# Patient Record
Sex: FEMALE | Race: WHITE | Employment: OTHER | URBAN - METROPOLITAN AREA
[De-identification: names, ages, dates, MRNs, and addresses within clinical notes are randomized per-mention and may not be internally consistent; named-entity substitution may affect disease eponyms.]

---

## 2017-05-16 ENCOUNTER — TRANSFERRED RECORDS (OUTPATIENT)
Dept: MULTI SPECIALTY CLINIC | Facility: CLINIC | Age: 62
End: 2017-05-16

## 2017-05-25 ENCOUNTER — RADIANT APPOINTMENT (OUTPATIENT)
Dept: MAMMOGRAPHY | Facility: CLINIC | Age: 62
End: 2017-05-25
Payer: COMMERCIAL

## 2017-05-25 ENCOUNTER — RESULT FOLLOW UP (OUTPATIENT)
Dept: OBGYN | Facility: CLINIC | Age: 62
End: 2017-05-25

## 2017-05-25 ENCOUNTER — OFFICE VISIT (OUTPATIENT)
Dept: OBGYN | Facility: CLINIC | Age: 62
End: 2017-05-25
Payer: COMMERCIAL

## 2017-05-25 VITALS
HEART RATE: 56 BPM | WEIGHT: 125 LBS | DIASTOLIC BLOOD PRESSURE: 70 MMHG | BODY MASS INDEX: 21.34 KG/M2 | SYSTOLIC BLOOD PRESSURE: 106 MMHG | HEIGHT: 64 IN

## 2017-05-25 DIAGNOSIS — Z12.4 CERVICAL CANCER SCREENING: ICD-10-CM

## 2017-05-25 DIAGNOSIS — Z01.419 ENCOUNTER FOR GYNECOLOGICAL EXAMINATION WITHOUT ABNORMAL FINDING: Primary | ICD-10-CM

## 2017-05-25 DIAGNOSIS — Z12.31 VISIT FOR SCREENING MAMMOGRAM: ICD-10-CM

## 2017-05-25 PROCEDURE — G0202 SCR MAMMO BI INCL CAD: HCPCS | Mod: TC

## 2017-05-25 PROCEDURE — 87624 HPV HI-RISK TYP POOLED RSLT: CPT | Performed by: OBSTETRICS & GYNECOLOGY

## 2017-05-25 PROCEDURE — 99396 PREV VISIT EST AGE 40-64: CPT | Performed by: OBSTETRICS & GYNECOLOGY

## 2017-05-25 PROCEDURE — 77063 BREAST TOMOSYNTHESIS BI: CPT | Mod: TC

## 2017-05-25 PROCEDURE — G0145 SCR C/V CYTO,THINLAYER,RESCR: HCPCS | Performed by: OBSTETRICS & GYNECOLOGY

## 2017-05-25 ASSESSMENT — ANXIETY QUESTIONNAIRES
1. FEELING NERVOUS, ANXIOUS, OR ON EDGE: NOT AT ALL
2. NOT BEING ABLE TO STOP OR CONTROL WORRYING: NOT AT ALL
IF YOU CHECKED OFF ANY PROBLEMS ON THIS QUESTIONNAIRE, HOW DIFFICULT HAVE THESE PROBLEMS MADE IT FOR YOU TO DO YOUR WORK, TAKE CARE OF THINGS AT HOME, OR GET ALONG WITH OTHER PEOPLE: NOT DIFFICULT AT ALL
GAD7 TOTAL SCORE: 0
6. BECOMING EASILY ANNOYED OR IRRITABLE: NOT AT ALL
5. BEING SO RESTLESS THAT IT IS HARD TO SIT STILL: NOT AT ALL
3. WORRYING TOO MUCH ABOUT DIFFERENT THINGS: NOT AT ALL
7. FEELING AFRAID AS IF SOMETHING AWFUL MIGHT HAPPEN: NOT AT ALL

## 2017-05-25 ASSESSMENT — PATIENT HEALTH QUESTIONNAIRE - PHQ9: 5. POOR APPETITE OR OVEREATING: NOT AT ALL

## 2017-05-25 NOTE — PROGRESS NOTES
Kristi is a 62 year old  female who presents for annual exam.     Besides routine health maintenance, she has no other health concerns today .    HPI:  The patient's PCP is Saji An MD.      No VB/spotting.     Exercising 5x/wk, varied.   Does not take calcium.     PCP manages labs meds.       GYNECOLOGIC HISTORY:    No LMP recorded. Patient is postmenopausal.  Her current contraception method is: menopause.  She  reports that she has quit smoking. She does not have any smokeless tobacco history on file.    Patient is not sexually active.  STD testing offered?  Declined  Last PHQ-9 score on record =   PHQ-9 SCORE 2017   Total Score 0     Last GAD7 score on record =   ALEC-7 SCORE 2017   Total Score 0     Alcohol Score = 3    HEALTH MAINTENANCE:  Cholesterol: 10/26/11   Total= 221, Triglycerides=79, HDL=86, NYF=152, FBS=92, Dr An checked it this last year   Cholesterol   Date Value Ref Range Status   10/26/2011 221 (A) 115 - 199 mg/dL Final    Last Mammo: 16, Result: normal, Next Mammo: today   Pap:   WNL,  wnl,  wnl, 4/3/12, Neg, Hpv Neg  Colonoscopy:  1 week  All normal, Result: normal, Next Colonoscopy:  years.  Dexa:  6/8/15 T-1.5, Osteopenia    Health maintenance updated:  yes    HISTORY:  Obstetric History       T2      L3     SAB0   TAB0   Ectopic0   Multiple0   Live Births0       # Outcome Date GA Lbr Alexx/2nd Weight Sex Delivery Anes PTL Lv   4   33w0d  4 lb 6 oz (1.984 kg) F    KAMILLE      Name: Verito   3 Term  39w0d  6 lb 3 oz (2.807 kg) M    KAMILLE      Name: Alden   2 Term  37w0d  5 lb 10 oz (2.551 kg) M    K[place]ILLE      Name: Surya   1 SAB                   Patient Active Problem List   Diagnosis     Essential hypertension     Cerebral infarction (H)     Hypotension, postural     Past Surgical History:   Procedure Laterality Date     NO HISTORY OF SURGERY        Social History   Substance Use Topics     Smoking status:  "Former Smoker     Smokeless tobacco: Not on file     Alcohol use 0.0 oz/week     0 Standard drinks or equivalent per week      Problem (# of Occurrences) Relation (Name,Age of Onset)    Anxiety Disorder (1) Daughter    Colon Cancer (1) Paternal Grandfather (60)    DIABETES (1) Mother    Hypertension (1) Mother    OSTEOPOROSIS (1) Father            Current Outpatient Prescriptions   Medication Sig     Cyanocobalamin (VITAMIN B-12 PO)      VITAMIN D, CHOLECALCIFEROL, PO Take by mouth daily     ASPIRIN PO Take  by mouth.       quinapril (ACCUPRIL) 40 MG tablet TAKE ONE TABLET BY MOUTH EVERY DAY     atenolol (TENORMIN) 50 MG tablet Take 0.5 tablets by mouth daily.     No current facility-administered medications for this visit.      No Known Allergies    Past medical, surgical, social and family histories were reviewed and updated in EPIC.    ROS:   12 point review of systems negative other than symptoms noted below.    EXAM:  /70  Pulse 56  Ht 5' 3.5\" (1.613 m)  Wt 125 lb (56.7 kg)  BMI 21.8 kg/m2   BMI: Body mass index is 21.8 kg/(m^2).    PHYSICAL EXAM:  Constitutional:  Appearance: Well nourished, well developed, alert, in no acute distress  Neck:  Lymph Nodes:  No lymphadenopathy present    Thyroid:  Gland size normal, nontender, no nodules or masses present  on palpation  Chest:  Respiratory Effort:  Breathing unlabored  Cardiovascular:    Heart: Auscultation:  Regular rate, normal rhythm, no murmurs present  Breasts: Inspection of Breasts:  No lymphadenopathy present    Palpation of Breasts and Axillae:  No masses present on palpation, no  breast tenderness    Axillary Lymph Nodes:  No lymphadenopathy present  Gastrointestinal:   Abdominal Examination:  Abdomen nontender to palpation, tone normal without rigidity or guarding, no masses present, umbilicus without lesions   Liver and Spleen:  No hepatomegaly present, liver nontender to palpation    Hernias:  No hernias present  Lymphatic: Lymph Nodes:  No " other lymphadenopathy present  Skin:  General Inspection:  No rashes present, no lesions present, no areas of  discoloration    Genitalia and Groin:  No rashes present, no lesions present, no areas of  discoloration, no masses present  Neurologic/Psychiatric:    Mental Status:  Oriented X3     Pelvic Exam:  External Genitalia:     Normal appearance for age, no discharge present, no tenderness present, no inflammatory lesions present, color normal  Vagina:     Normal vaginal vault without central or paravaginal defects, no discharge present, no inflammatory lesions present, no masses present  Bladder:     Nontender to palpation  Urethra:   Urethral Body:  Urethra palpation normal, urethra structural support normal   Urethral Meatus:  No erythema or lesions present  Cervix:     Appearance healthy, no lesions present, nontender to palpation, no bleeding present  Uterus:     Nontender to palpation, no masses present, position anteflexed, mobility: normal  Adnexa:     No adnexal tenderness present, no adnexal masses present  Perineum:     Perineum within normal limits, no evidence of trauma, no rashes or skin lesions present  Anus:     Anus within normal limits, no hemorrhoids present  Inguinal Lymph Nodes:     No lymphadenopathy present  Pubic Hair:     Normal pubic hair distribution for age  Genitalia and Groin:     No rashes present, no lesions present, no areas of discoloration, no masses present    COUNSELING:   Reviewed preventive health counseling, as reflected in patient instructions       Osteoporosis Prevention/Bone Health    BMI: Body mass index is 21.8 kg/(m^2).      ASSESSMENT:  62 year old female with satisfactory annual exam.    ICD-10-CM    1. Encounter for gynecological examination without abnormal finding Z01.419 Pap imaged thin layer screen with HPV - recommended age 30 - 65     HPV High Risk Types DNA Cervical       PLAN:  -Pap/hpv obtained for cervical cancer screening. Reviewed guidelines.  -Breast  self awareness discussed. Due for mammogram.  -Discussed exercise-making plan, strength training. Nutrition encouraged.  -Colonoscopy UTD  -Osteoporosis prevention discussed.  -PCP manages labs  -PMB precautions  -Return one year for next annual exam      Morelia Teran Masters, DO

## 2017-05-25 NOTE — LETTER
May 11, 2018      Kristi ENGL[place] More  [address] [facility] [address]    Dear MsUli,      At [facility], your health and wellness is our primary concern. That is why we are following up on a positive high risk HPV test from 05/25/17. Your provider had recommended that you have a Pap smear and HPV test completed by 05/25/18. Our records do not show that this has been scheduled.    It is important to complete the follow up that your provider has suggested for you to ensure that there are no worsening changes which may, over time, develop into cancer.      Please contact our office at  [phone] to schedule an appointment for a Pap smear and HPV test at your earliest convenience. If you have questions or concerns, please call the clinic and we will be happy to assist you.    If you have completed the tests outside of [facility], please have the results forwarded to our office. We will update the chart for your primary Physician to review before your next annual physical.     Thank you for choosing [facility]!    Sincerely,      Morelia Teran Masters, DO/esh

## 2017-05-25 NOTE — MR AVS SNAPSHOT
After Visit Summary   5/25/2017    Kristi Aguero    MRN: [id number]           Patient Information     Date Of Birth          1955        Visit Information        Provider Department      5/25/2017 10:30 AM Morelia Crabtree,  [facility]        Today's Diagnoses     Encounter for gynecological examination without abnormal finding    -  1    Cervical cancer screening           Follow-ups after your visit        Follow-up notes from your care team     Return in about 1 year (around 5/25/2018).      Your next 10 appointments already scheduled     May 25, 2017 11:00 AM CDT   MA SCREENING DIGITAL BILATERAL with WEMA1   [facility] ([facility])    [address] [place] [address], [address]  [facility] [address] [phone]           Do not use any powder, lotion or deodorant under your arms or on your breast. If you do, we will ask you to remove it before your exam.  Wear comfortable, two-piece clothing.  If you have any allergies, tell your care team.  Bring any previous mammograms from other facilities or have them mailed to the breast center.              Who to contact     If you have questions or need follow up information about today's clinic visit or your schedule please contact [facility] directly at [phone].  Normal or non-critical lab and imaging results will be communicated to you by MyChart, letter or phone within 4 business days after the clinic has received the results. If you do not hear from us within 7 days, please contact the clinic through MyChart or phone. If you have a critical or abnormal lab result, we will notify you by phone as soon as possible.  Submit refill requests through test company or call your pharmacy and they will forward the refill request to us. Please allow 3 business days for your refill to be completed.          Additional Information About Your Visit        MyChart Information      "[employer] lets you send messages to your doctor, view your test results, renew your prescriptions, schedule appointments and more. To sign up, go to [email or web address] . Click on \"Log in\" on the left side of the screen, which will take you to the Welcome page. Then click on \"Sign up Now\" on the right side of the page.     You will be asked to enter the access code listed below, as well as some personal information. Please follow the directions to create your username and password.     Your access code is: [email or web address]  Expires: 2017 10:57 AM     Your access code will  in 90 days. If you need help or a new code, please call your [facility] clinic or [phone].        Care EveryWhere ID     This is your [facility] EveryWhere ID. This could be used by other organizations to access your [facility] medical records  [id number]        Your Vitals Were     Pulse Height BMI (Body Mass Index)             56 5' 3.5\" (1.613 m) 21.8 kg/m2          Blood Pressure from Last 3 Encounters:   17 106/70   16 102/68   05/19/15 126/78    Weight from Last 3 Encounters:   17 125 lb (56.7 kg)   16 130 lb (59 kg)   05/19/15 128 lb (58.1 kg)              We Performed the Following     HPV High Risk Types DNA Cervical     Pap imaged thin layer screen with HPV - recommended age 30 - 65        Primary Care Provider Office Phone # Fax #    Saji An [phone]       [address] [address]        Thank you!     Thank you for choosing [facility]  for your care. Our goal is always to provide you with excellent care. Hearing back from our patients is one way we can continue to improve our services. Please take a few minutes to complete the written survey that you may receive in the mail after your visit with us. Thank you!             Your Updated Medication List - Protect others around you: Learn how to safely use, store and " throw away your medicines at www.disposemymeds.org.          This list is accurate as of: 5/25/17 10:57 AM.  Always use your most recent med list.                   Brand Name Dispense Instructions for use    ASPIRIN PO      Take  by mouth.       atenolol 50 MG tablet    TENORMIN    90 tablet    Take 0.5 tablets by mouth daily.       quinapril 40 MG Tab    ACCUPRIL    90 tablet    TAKE ONE TABLET BY MOUTH EVERY DAY       VITAMIN B-12 PO          VITAMIN D (CHOLECALCIFEROL) PO      Take by mouth daily

## 2017-05-25 NOTE — LETTER
June 1, 2017      Kristi Aguero  [address] [address] [facility] [address]    Dear ,      This letter is in regards to the PAP smear and HPV (Human Papillomavirus) test you had done recently. Your PAP test result is normal, but your HPV (Human Papillomavirus) test was positive.     About 80 percent of women have been exposed to HPV virus throughout their lifetime. There is no medication for the treatment of HPV. Typically your own immune system gets rid of the virus before it does harm. HPV is spread by direct skin-to-skin contact, including sexual intercourse, oral sex, anal sex, or any other contact involving the genital area (example: hand to genital contact). It is not possible to become infected with HPV by touching an object, such as a toilet seat. Most people who are infected with HPV have no signs or symptoms.    Things that you can do to boost your immune system and help your body get rid of HPV: get plenty of rest, eat a well-balanced diet of healthy foods, and stop smoking.     Please return in 1 year to repeat your pap smear and HPV test.     If you have additional questions regarding this result, please call [phone].    Sincerely,      Morelia Currys, DO/esh

## 2017-05-26 ASSESSMENT — ANXIETY QUESTIONNAIRES: GAD7 TOTAL SCORE: 0

## 2017-05-26 ASSESSMENT — PATIENT HEALTH QUESTIONNAIRE - PHQ9: SUM OF ALL RESPONSES TO PHQ QUESTIONS 1-9: 0

## 2017-05-30 LAB
COPATH REPORT: NORMAL
PAP: NORMAL

## 2017-06-01 PROBLEM — R87.810 CERVICAL HIGH RISK HPV (HUMAN PAPILLOMAVIRUS) TEST POSITIVE: Status: ACTIVE | Noted: 2017-05-25

## 2017-06-01 LAB
FINAL DIAGNOSIS: ABNORMAL
HPV HR 12 DNA CVX QL NAA+PROBE: POSITIVE
HPV16 DNA SPEC QL NAA+PROBE: NEGATIVE
HPV18 DNA SPEC QL NAA+PROBE: NEGATIVE
SPECIMEN DESCRIPTION: ABNORMAL

## 2017-06-01 NOTE — PROGRESS NOTES
5/25/17 NIL/+ HR HPV (not 16 or 18). Plan: cotest in 1 year  06/01/17 Result letter printed and sent at request of RN. ([facility])   05/11/18 Pap reminder letter sent. ([facility])  6/1/18 NIL pap/+ HR HPV (not 16 or 18). Plan: colposcopy by 9/1/18 6/11/18 left msg/advised of result and follow up  6/15/18 Colpo: ECC-benign. Plan: Cotest in 1 year ([facility])  6/4/19 NIL pap, Neg HPV. Plan: cotest in 3 years ([facility])

## 2017-08-17 ENCOUNTER — TELEPHONE (OUTPATIENT)
Dept: NURSING | Facility: CLINIC | Age: 62
End: 2017-08-17

## 2017-08-17 NOTE — TELEPHONE ENCOUNTER
Annual 5/25/17. Pt received letter in the mail from Dr. Crabtree and can not find of the letter. Reviewed letter sent 6/1/17 regarding pap and HPV testing. Offered to re-mail letter, pt decline. Will make appointment to be seen in one year for repeat pap and HPV.

## 2018-02-15 ENCOUNTER — OFFICE VISIT (OUTPATIENT)
Dept: OBGYN | Facility: CLINIC | Age: 63
End: 2018-02-15
Payer: COMMERCIAL

## 2018-02-15 VITALS
BODY MASS INDEX: 22.36 KG/M2 | HEART RATE: 76 BPM | DIASTOLIC BLOOD PRESSURE: 84 MMHG | WEIGHT: 131 LBS | SYSTOLIC BLOOD PRESSURE: 132 MMHG | HEIGHT: 64 IN

## 2018-02-15 DIAGNOSIS — M25.559 PAIN IN JOINT INVOLVING PELVIC REGION AND THIGH, UNSPECIFIED LATERALITY: Primary | ICD-10-CM

## 2018-02-15 PROCEDURE — 99214 OFFICE O/P EST MOD 30 MIN: CPT | Performed by: OBSTETRICS & GYNECOLOGY

## 2018-02-15 NOTE — PROGRESS NOTES
SUBJECTIVE:                                                   Kristi Aguero is a 63 year old female who presents to clinic today for the following health issue(s):  Patient presents with:  Pelvic Pain: has been having ongoing pelvic aches. Comes and goes, but is there most days.         HPI:  Reports low pelvic aching for 2-3 weeks. Happens more days than not. Will go to the inner thigh area as well. Nothing makes it better. Has not tried tylenol/ibuprofen. Made worse with sprinting.   No spotting/bleeding. No bowel or bladder changes. Has daily BM. No change in weight or appetite.  Has done some more intense workouts but not sure this would be reason. Has done just as much in past without this affect.  Never had any thing like this before.  Not SA, no partner.    Last colonoscopy 1yr ago.    Review of PMH, SocHx, SurHx, FHx, medications completed. Epic updated.      No LMP recorded. Patient is postmenopausal..   Patient is not sexually active, .  Using menopause and not sexually active for contraception.    reports that she has quit smoking. She has never used smokeless tobacco.    STD testing offered?  Declined    Health maintenance updated:  yes    Today's PHQ-2 Score:   PHQ-2 (  [employer]) 2/15/2018   Q1: Little interest or pleasure in doing things 0   Q2: Feeling down, depressed or hopeless 0   PHQ-2 Score 0     Today's PHQ-9 Score:   PHQ-9 SCORE 2017   Total Score 0     Today's ALEC-7 Score:   ALEC-7 SCORE 2017   Total Score 0       Problem list and histories reviewed & adjusted, as indicated.  Additional history: as documented.    Patient Active Problem List   Diagnosis     Essential hypertension     Cerebral infarction (H)     Hypotension, postural     Cervical high risk HPV (human papillomavirus) test positive     Past Surgical History:   Procedure Laterality Date     NO HISTORY OF SURGERY        Social History   Substance Use Topics     Smoking status: Former Smoker     Smokeless tobacco:  "Never Used     Alcohol use 0.0 oz/week     0 Standard drinks or equivalent per week      Problem (# of Occurrences) Relation (Name,Age of Onset)    Anxiety Disorder (1) Daughter    Colon Cancer (1) Paternal Grandfather (60)    DIABETES (1) Mother    Hypertension (1) Mother    OSTEOPOROSIS (1) Father            Current Outpatient Prescriptions   Medication Sig     omeprazole (PRILOSEC) 20 MG CR capsule Take 20 mg by mouth     Cyanocobalamin (VITAMIN B-12 PO)      VITAMIN D, CHOLECALCIFEROL, PO Take by mouth daily     ASPIRIN PO Take  by mouth.       quinapril (ACCUPRIL) 40 MG tablet TAKE ONE TABLET BY MOUTH EVERY DAY     atenolol (TENORMIN) 50 MG tablet Take 0.5 tablets by mouth daily.     No current facility-administered medications for this visit.      No Known Allergies    ROS:  12 point review of systems negative other than symptoms noted below.  Genitourinary: Pelvic Pain    OBJECTIVE:     /84  Pulse 76  Ht 5' 3.5\" (1.613 m)  Wt 131 lb (59.4 kg)  BMI 22.84 kg/m2  Body mass index is 22.84 kg/(m^2).    Exam:  Constitutional:  Appearance: Well nourished, well developed alert, in no acute distress  Chest:  Respiratory Effort:  Breathing unlabored  Gastrointestinal:  Abdominal Examination:  Abdomen nontender to palpation, tone normal without rigidity or guarding, no masses present, umbilicus without lesions; Liver/Spleen:  No hepatomegaly present, liver nontender to palpation; Hernias:  No hernias present  Lymphatic: Lymph Nodes:  No other lymphadenopathy present  Skin:General Inspection:  No rashes present, no lesions present, no areas of discoloration; Genitalia and Groin:  No rashes present, no lesions present, no areas of discoloration, no masses present.  Neurologic/Psychiatric:  Mental Status:  Oriented X3   Pelvic Exam:  External Genitalia:     Normal appearance for age, no discharge present, no tenderness present, no inflammatory lesions present, color normal  Vagina:     Normal vaginal vault " without central or paravaginal defects, no discharge present, no inflammatory lesions present, no masses present, inner pelvic sidewalls tender bilaterally, hypertonic.  Bladder:     Nontender to palpation  Urethra:   Urethral Body:  Urethra palpation normal, urethra structural support normal   Urethral Meatus:  No erythema or lesions present  Cervix:     Appearance healthy, no lesions present, nontender to palpation, no bleeding present  Uterus:     Uterus: firm, normal sized and nontender, midplane in position.   Adnexa:     No adnexal tenderness present, no adnexal masses present  Perineum:     Perineum within normal limits, no evidence of trauma, no rashes or skin lesions present  Anus:     Anus within normal limits, no hemorrhoids present  Inguinal Lymph Nodes:     No lymphadenopathy present  Pubic Hair:     Normal pubic hair distribution for age  Genitalia and Groin:     No rashes present, no lesions present, no areas of discoloration, no masses present       ASSESSMENT/PLAN:                                                        ICD-10-CM    1. Pain in joint involving pelvic region and thigh, unspecified laterality M25.559 US Transvaginal Non OB       -Diffuse pelvic pain. On exam, inner pelvic sidewalls tender and hypertonic bilaterally. Discussed possible etiologies. May be musculoskeletal based on exam and activity level. Will check pelvic US, if neg, will send for pelvic PT. Discussed how PT can help. Rec NSAIDs.    Morelia Teran Masters, DO  [facility] [place]

## 2018-02-15 NOTE — MR AVS SNAPSHOT
After Visit Summary   2/15/2018    Kristi Aguero    MRN: [id number]           Patient Information     Date Of Birth          1955        Visit Information        Provider Department      2/15/2018 10:50 AM Morelia Crabtree,  [facility]        Today's Diagnoses     Pain in joint involving pelvic region and thigh, unspecified laterality    -  1       Follow-ups after your visit        Your next 10 appointments already scheduled     Mar 05, 2018  2:30 PM CST   US PELVIC COMPLETE W TRANSVAGINAL with WEUS2   [facility] ([facility])    [address] [address] [phone]           Please bring a list of your medicines (including vitamins, minerals and over-the-counter drugs). Also, tell your doctor about any allergies you may have. Wear comfortable clothes and leave your valuables at home.  Adults: Drink six 8-ounce glasses of fluid one hour before your exam. Do NOT empty your bladder.  If you need to empty your bladder before your exam, try to release only a little bit of urine. Then, drink another 8oz glass of fluid.  Children: Children who are potty trained should drink at least 4 cups (32 oz) of liquid 45 minutes to one hour prior to the exam. The child s bladder must be full in order to achieve a diagnostic exam. If your child is very uncomfortable or has an urgent need to pee, please notify a technologist; they will try to find out how much longer the wait may be and provide instructions to help relieve the pressure. Occasionally it is medically necessary to insert a urinary catheter to fill the bladder.  Please call the Imaging Department at your exam site with any questions.            Mar 05, 2018  3:10 PM CST   Office Visit with Morelia Crabtree,    [facility] ([facility])    [address]  [place] [address]  "  [phone]           Bring a current list of meds and any records pertaining to this visit. For Physicals, please bring immunization records and any forms needing to be filled out. Please arrive 10 minutes early to complete paperwork.              Future tests that were ordered for you today     Open Future Orders        Priority Expected Expires Ordered    US Transvaginal Non OB Routine  2019 2/15/2018            Who to contact     If you have questions or need follow up information about today's clinic visit or your schedule please contact [facility] [place] directly at [phone].  Normal or non-critical lab and imaging results will be communicated to you by [employer], letter or phone within 4 business days after the clinic has received the results. If you do not hear from us within 7 days, please contact the clinic through [employer] or phone. If you have a critical or abnormal lab result, we will notify you by phone as soon as possible.  Submit refill requests through [employer] or call your pharmacy and they will forward the refill request to us. Please allow 3 business days for your refill to be completed.          Additional Information About Your Visit        MyChart Information     [employer] lets you send messages to your doctor, view your test results, renew your prescriptions, schedule appointments and more. To sign up, go to [email or web address] . Click on \"Log in\" on the left side of the screen, which will take you to the Welcome page. Then click on \"Sign up Now\" on the right side of the page.     You will be asked to enter the access code listed below, as well as some personal information. Please follow the directions to create your username and password.     Your access code is: [email or web address]  Expires: 2018 11:12 AM     Your access code will  in 90 days. If you need help or a new code, please call your [place] clinic or [phone].        Care EveryWhere ID     This is " "your Care EveryWhere ID. This could be used by other organizations to access your [place] medical records  [id number]        Your Vitals Were     Pulse Height BMI (Body Mass Index)             76 5' 3.5\" (1.613 m) 22.84 kg/m2          Blood Pressure from Last 3 Encounters:   02/15/18 132/84   05/25/17 106/70   05/23/16 102/68    Weight from Last 3 Encounters:   02/15/18 131 lb (59.4 kg)   05/25/17 125 lb (56.7 kg)   05/23/16 130 lb (59 kg)               Primary Care Provider Office Phone # Fax #    Saji An [phone]       [place] [address]        Equal Access to Services     NICHOLAS PANDEY : Hadii junior edwards hadasho Soomaali, waaxda luqadaha, qaybta kaalmada adeegyada, sanket rosa . So [facility] [phone].    ATENCIÓN: Si habla español, tiene a gaspar disposición servicios gratuitos de asistencia lingüística. Llame al [phone].    We comply with applicable federal civil rights laws and [place] laws. We do not discriminate on the basis of race, color, national origin, age, disability, sex, sexual orientation, or gender identity.            Thank you!     Thank you for choosing [facility]  for your care. Our goal is always to provide you with excellent care. Hearing back from our patients is one way we can continue to improve our services. Please take a few minutes to complete the written survey that you may receive in the mail after your visit with us. Thank you!             Your Updated Medication List - Protect others around you: Learn how to safely use, store and throw away your medicines at www.disposemymeds.org.          This list is accurate as of 2/15/18 11:12 AM.  Always use your most recent med list.                   Brand Name Dispense Instructions for use Diagnosis    ASPIRIN PO      Take  by mouth.        atenolol 50 MG tablet    TENORMIN    90 tablet    Take 0.5 tablets by mouth " daily.    Unspecified essential hypertension       omeprazole 20 MG CR capsule    priLOSEC     Take 20 mg by mouth        quinapril 40 MG Tab    ACCUPRIL    90 tablet    TAKE ONE TABLET BY MOUTH EVERY DAY    Muscle cramps       VITAMIN B-12 PO           VITAMIN D (CHOLECALCIFEROL) PO      Take by mouth daily

## 2018-02-19 ENCOUNTER — OFFICE VISIT (OUTPATIENT)
Dept: OBGYN | Facility: CLINIC | Age: 63
End: 2018-02-19
Payer: COMMERCIAL

## 2018-02-19 ENCOUNTER — RADIANT APPOINTMENT (OUTPATIENT)
Dept: ULTRASOUND IMAGING | Facility: CLINIC | Age: 63
End: 2018-02-19
Payer: COMMERCIAL

## 2018-02-19 VITALS
HEIGHT: 64 IN | BODY MASS INDEX: 22.3 KG/M2 | WEIGHT: 130.6 LBS | DIASTOLIC BLOOD PRESSURE: 80 MMHG | SYSTOLIC BLOOD PRESSURE: 138 MMHG

## 2018-02-19 DIAGNOSIS — M25.559 PAIN IN JOINT INVOLVING PELVIC REGION AND THIGH, UNSPECIFIED LATERALITY: ICD-10-CM

## 2018-02-19 DIAGNOSIS — Z78.0 POSTMENOPAUSE: ICD-10-CM

## 2018-02-19 DIAGNOSIS — R93.89 THICKENED ENDOMETRIUM: Primary | ICD-10-CM

## 2018-02-19 DIAGNOSIS — Z80.49 FAMILY HISTORY OF MALIGNANT NEOPLASM OF ENDOMETRIUM: ICD-10-CM

## 2018-02-19 PROCEDURE — 99213 OFFICE O/P EST LOW 20 MIN: CPT | Mod: 25 | Performed by: OBSTETRICS & GYNECOLOGY

## 2018-02-19 PROCEDURE — 58100 BIOPSY OF UTERUS LINING: CPT | Performed by: OBSTETRICS & GYNECOLOGY

## 2018-02-19 PROCEDURE — 76830 TRANSVAGINAL US NON-OB: CPT | Performed by: OBSTETRICS & GYNECOLOGY

## 2018-02-19 PROCEDURE — 88305 TISSUE EXAM BY PATHOLOGIST: CPT | Performed by: OBSTETRICS & GYNECOLOGY

## 2018-02-19 NOTE — PROGRESS NOTES
SUBJECTIVE:                                                   Kristi Aguero is a 63 year old female who presents to clinic today for the following health issue(s):  Patient presents with:  Ultrasound: here for follow up for pelvic pain. patient states still having pelvic pain      HPI:  No new changes since last visit.   No vaginal discharge.     Sister had endometrial cancer and needed hyst.     No LMP recorded. Patient is postmenopausal..   Patient is not sexually active, .  Using menopause for contraception.    reports that she has quit smoking. She has never used smokeless tobacco.    STD testing offered?  Declined    Health maintenance updated:  yes    Today's PHQ-2 Score:   PHQ-2 (  [employer]) 2/15/2018   Q1: Little interest or pleasure in doing things 0   Q2: Feeling down, depressed or hopeless 0   PHQ-2 Score 0     Today's PHQ-9 Score:   PHQ-9 SCORE 2017   Total Score 0     Today's ALEC-7 Score:   ALEC-7 SCORE 2017   Total Score 0       Problem list and histories reviewed & adjusted, as indicated.  Additional history: as documented.    Patient Active Problem List   Diagnosis     Essential hypertension     Cerebral infarction (H)     Hypotension, postural     Cervical high risk HPV (human papillomavirus) test positive     Past Surgical History:   Procedure Laterality Date     NO HISTORY OF SURGERY        Social History   Substance Use Topics     Smoking status: Former Smoker     Smokeless tobacco: Never Used     Alcohol use 0.0 oz/week     0 Standard drinks or equivalent per week      Problem (# of Occurrences) Relation (Name,Age of Onset)    Anxiety Disorder (1) Daughter    Colon Cancer (1) Paternal Grandfather (60)    DIABETES (1) Mother    Endometrial Cancer (1) Sister (50)    Hypertension (1) Mother    OSTEOPOROSIS (1) Father            Current Outpatient Prescriptions   Medication Sig     omeprazole (PRILOSEC) 20 MG CR capsule Take 20 mg by mouth     Cyanocobalamin (VITAMIN B-12 PO)       "VITAMIN D, CHOLECALCIFEROL, PO Take by mouth daily     ASPIRIN PO Take  by mouth.       quinapril (ACCUPRIL) 40 MG tablet TAKE ONE TABLET BY MOUTH EVERY DAY     atenolol (TENORMIN) 50 MG tablet Take 0.5 tablets by mouth daily.     No current facility-administered medications for this visit.      No Known Allergies    ROS:  12 point review of systems negative other than symptoms noted below.    OBJECTIVE:     /80  Ht 5' 3.5\" (1.613 m)  Wt 130 lb 9.6 oz (59.2 kg)  BMI 22.77 kg/m2  Body mass index is 22.77 kg/(m^2).    Exam:  Constitutional:  Appearance: Well nourished, well developed alert, in no acute distress  Chest:  Respiratory Effort:  Breathing unlabored  Lymphatic: Lymph Nodes:  No other lymphadenopathy present  Skin:General Inspection:  No rashes present, no lesions present, no areas of discoloration; Genitalia and Groin:  No rashes present, no lesions present, no areas of discoloration, no masses present.  Neurologic/Psychiatric:  Mental Status:  Oriented X3   Pelvic Exam:  External Genitalia:     Normal appearance for age, no discharge present, no tenderness present, no inflammatory lesions present, color normal  Vagina:     Normal vaginal vault without central or paravaginal defects, no discharge present, no inflammatory lesions present, no masses present  Bladder:     Nontender to palpation  Urethra:   Urethral Body:  Urethra palpation normal, urethra structural support normal   Urethral Meatus:  No erythema or lesions present  Cervix:     Appearance healthy, no lesions present, nontender to palpation, no bleeding present  Perineum:     Perineum within normal limits, no evidence of trauma, no rashes or skin lesions present  Anus:     Anus within normal limits, no hemorrhoids present  Inguinal Lymph Nodes:     No lymphadenopathy present  Pubic Hair:     Normal pubic hair distribution for age  Genitalia and Groin:     No rashes present, no lesions present, no areas of discoloration, no masses " present       In-Clinic Test Results:  Results for orders placed or performed in visit on 02/19/18 (from the past 24 hour(s))   US Transvaginal Non OB    Narrative                    Gynecological Ultrasound Report  Pelvic U/S - Transvaginal    [facility]     Referring Provider: Dr. Morelia Crabtree  Sonographer: Nataliya Jones MS     Indication: Pelvic pain  LMP (mm/dd/yyyy): Postmenopausa    Gynecological Ultrasonography:   Uterus: anteverted  Size: 5.13 x 3.90 x 3.18cm.    Findings: Normal   Endometrium: Thickness total 5.89mm  Findings: Thicker area of endometrium at fundus  Right Ovary: 1.66 x .91 x .78cm.    Left Ovary: 1.47 x 1.39 x 1.41cm.   Cul de Sac/Pouch of Derrick: No FF      Impression:  Endometrium thickened for postmenopausal female at 5.89mm. Normal adnexa.  Clinical correlation is recommended.    Morelia Crabtree, DO  ___________________________________________________________________________  _______                      ASSESSMENT/PLAN:                                                        ICD-10-CM    1. Thickened endometrium R93.8 ENDOMETRIAL BIOPSY W/O CERVICAL DILATION     Surgical pathology exam   2. Pain in joint involving pelvic region and thigh, unspecified laterality M25.559    3. Family history of malignant neoplasm of endometrium Z80.49 ENDOMETRIAL BIOPSY W/O CERVICAL DILATION     Surgical pathology exam   4. Postmenopause Z78.0 ENDOMETRIAL BIOPSY W/O CERVICAL DILATION     Surgical pathology exam       -Reviewed US findings with pt, thickened endometrium for PM state, is asymptomatic otherwise. Has nonspecific thickening in fundal region as well. Reviewed US from 2011, report without abnormalities (no growths), ES 1mm. Images could not be seen. Discussed potential significance, ie pre/malignant disease, and further eval available with EMB. Discussed is not likely source of her pelvic pain, would rec moving forward with Pelvic PT tx for MSK pain. Pt consented  for EMB. Will call with results and f/u plan.   Questions answered.     Morelia Crabtree DO  [facility] [place]          INDICATIONS:                                                    Is a pregnancy test required: No.  Was a consent obtained?  Yes    Having endometrial biopsy for Thickened endometrium postmenopause    Today's PHQ-2 Score:   PHQ-2 ( 1999 [employer]) 2/15/2018   Q1: Little interest or pleasure in doing things 0   Q2: Feeling down, depressed or hopeless 0   PHQ-2 Score 0       PROCEDURE;                                                      A speculum was placed in the vagina and cervix prepped with betadine. A tenaculum was attached to the cervix. A small plastic 5 mm Pipelle syringe curette was inserted into the cervical canal. The uterus was sounded to 6 cm's. A vigorous four quadrant biopsy was performed, removing amount moderate  of tissue. The speculum was removed. This tissue was placed in Formalin and sent to pathology.    The patient tolerated the procedure  well and she reported there was no cramping.      POST PROCEDURE;                                                      There  was no cramping at the time of discharge. She  tolerated the procedure well. There were no complications. Patient was discharged in stable condition.    Patient advised to call the clinic if severe pelvic pain, fever or heavy bleeding.    Morelia Crabtree DO

## 2018-02-19 NOTE — MR AVS SNAPSHOT
"              After Visit Summary   2018    Kristi Aguero    MRN: [id number]           Patient Information     Date Of Birth          1955        Visit Information        Provider Department      2018 1:40 PM Morelia Crabtree DO [facility]        Today's Diagnoses     Thickened endometrium    -  1    Pain in joint involving pelvic region and thigh, unspecified laterality        Family history of malignant neoplasm of endometrium        Postmenopause           Follow-ups after your visit        Who to contact     If you have questions or need follow up information about today's clinic visit or your schedule please contact [facility] directly at [phone].  Normal or non-critical lab and imaging results will be communicated to you by MyChart, letter or phone within 4 business days after the clinic has received the results. If you do not hear from us within 7 days, please contact the clinic through MyChart or phone. If you have a critical or abnormal lab result, we will notify you by phone as soon as possible.  Submit refill requests through [employer] or call your pharmacy and they will forward the refill request to us. Please allow 3 business days for your refill to be completed.          Additional Information About Your Visit        MyChart Information     [employer] lets you send messages to your doctor, view your test results, renew your prescriptions, schedule appointments and more. To sign up, go to [email or web address] . Click on \"Log in\" on the left side of the screen, which will take you to the Welcome page. Then click on \"Sign up Now\" on the right side of the page.     You will be asked to enter the access code listed below, as well as some personal information. Please follow the directions to create your username and password.     Your access code is: [email or web address]  Expires: 2018 11:12 AM     Your access code will  in 90 days. If you " "need help or a new code, please call your [place] clinic or [phone].        Care EveryWhere ID     This is your Care EveryWhere ID. This could be used by other organizations to access your [place] medical records  [id number]        Your Vitals Were     Height BMI (Body Mass Index)                5' 3.5\" (1.613 m) 22.77 kg/m2           Blood Pressure from Last 3 Encounters:   02/19/18 138/80   02/15/18 132/84   05/25/17 106/70    Weight from Last 3 Encounters:   02/19/18 130 lb 9.6 oz (59.2 kg)   02/15/18 131 lb (59.4 kg)   05/25/17 125 lb (56.7 kg)              We Performed the Following     ENDOMETRIAL BIOPSY W/O CERVICAL DILATION     Surgical pathology exam        Primary Care Provider Office Phone # Fax #    Saji An [phone]       [place] [address]        Equal Access to Services     NICHOLAS PANDEY : Hadii aad ku hadasho Soomaali, waaxda luqadaha, qaybta kaalmada adeegyada, waxay idiin haymaría elenan ernestine rosa . So [facility] [phone].    ATENCIÓN: Si habla español, tiene a gaspar disposición servicios gratuitos de asistencia lingüística. LlSt. Charles [facility] [phone].    We comply with applicable federal civil rights laws and [place] laws. We do not discriminate on the basis of race, color, national origin, age, disability, sex, sexual orientation, or gender identity.            Thank you!     Thank you for choosing [facility] [place]  for your care. Our goal is always to provide you with excellent care. Hearing back from our patients is one way we can continue to improve our services. Please take a few minutes to complete the written survey that you may receive in the mail after your visit with us. Thank you!             Your Updated Medication List - Protect others around you: Learn how to safely use, store and throw away your medicines at www.disposemymeds.org.          This list is accurate as of 2/19/18  2:01 PM.  Always " use your most recent med list.                   Brand Name Dispense Instructions for use Diagnosis    ASPIRIN PO      Take  by mouth.        atenolol 50 MG tablet    TENORMIN    90 tablet    Take 0.5 tablets by mouth daily.    Unspecified essential hypertension       omeprazole 20 MG CR capsule    priLOSEC     Take 20 mg by mouth        quinapril 40 MG Tab    ACCUPRIL    90 tablet    TAKE ONE TABLET BY MOUTH EVERY DAY    Muscle cramps       VITAMIN B-12 PO           VITAMIN D (CHOLECALCIFEROL) PO      Take by mouth daily

## 2018-02-21 LAB — COPATH REPORT: NORMAL

## 2018-02-23 NOTE — PROGRESS NOTES
EMB showing benign tissue, and endometrial polyp. The polyp is likely what is causing the thickening we saw. I would recommend she make an office visit to discuss how we can treat it.  Please call pt to notify.     Morelia Crabtree, DO

## 2018-03-05 ENCOUNTER — TELEPHONE (OUTPATIENT)
Dept: OBGYN | Facility: CLINIC | Age: 63
End: 2018-03-05

## 2018-03-05 ENCOUNTER — OFFICE VISIT (OUTPATIENT)
Dept: OBGYN | Facility: CLINIC | Age: 63
End: 2018-03-05
Payer: COMMERCIAL

## 2018-03-05 VITALS
SYSTOLIC BLOOD PRESSURE: 142 MMHG | BODY MASS INDEX: 22.23 KG/M2 | DIASTOLIC BLOOD PRESSURE: 86 MMHG | HEIGHT: 64 IN | WEIGHT: 130.2 LBS

## 2018-03-05 DIAGNOSIS — L98.9 CHEST SKIN LESION: ICD-10-CM

## 2018-03-05 DIAGNOSIS — M25.559 PAIN IN JOINT INVOLVING PELVIC REGION AND THIGH, UNSPECIFIED LATERALITY: Primary | ICD-10-CM

## 2018-03-05 DIAGNOSIS — R93.89 THICKENED ENDOMETRIUM: ICD-10-CM

## 2018-03-05 DIAGNOSIS — N95.0 POSTMENOPAUSAL BLEEDING: ICD-10-CM

## 2018-03-05 DIAGNOSIS — M25.559 PAIN IN JOINT INVOLVING PELVIC REGION AND THIGH, UNSPECIFIED LATERALITY: ICD-10-CM

## 2018-03-05 DIAGNOSIS — K21.9 GASTROESOPHAGEAL REFLUX DISEASE WITHOUT ESOPHAGITIS: ICD-10-CM

## 2018-03-05 DIAGNOSIS — N84.0 ENDOMETRIAL POLYP: Primary | ICD-10-CM

## 2018-03-05 PROCEDURE — 99214 OFFICE O/P EST MOD 30 MIN: CPT | Performed by: OBSTETRICS & GYNECOLOGY

## 2018-03-05 NOTE — TELEPHONE ENCOUNTER
Reason for Call:  Other call back    Detailed comments: Patient needs to ask you 1 more question after her visit with you this morning?    Phone Number Patient can be reached at: Cell number on file:    Telephone Information:   Mobile [phone]       Best Time: today    Can we leave a detailed message on this number? YES    Call taken on 3/5/2018 at 1:49 PM by Minal Hein

## 2018-03-05 NOTE — PROGRESS NOTES
SUBJECTIVE:                                                   Kristi Aguero is a 63 year old female who presents to clinic today for the following health issue(s):  Patient presents with:  Follow Up For: discuss biopsy results and treatment      HPI:  C/O a mole on her left breast, has been there for some time. Sees a dermatologist and gets mole checks. Due for yearly check.  Left breast was itchy about a month ago. Went away on it's own. No rash present. No nipple changes/discahrge.  Has also had heartburn one month ago, went to her internist. Given prilosec. Returned when stopped the prilosec. Drinks etoh in evening before bed, this is an irritant.   Due to all the symptoms above, is worried about metastatic melanoma. Was reading on the internet.     US  showed 5.89mm ES. EMB showed benign tissue with polyp      No LMP recorded. Patient is postmenopausal..   Patient is not sexually active, .  Using menopause for contraception.    reports that she has quit smoking. She has never used smokeless tobacco.    STD testing offered?  Declined    Health maintenance updated:  yes    Today's PHQ-2 Score:   PHQ-2 (  [employer]) 2/15/2018   Q1: Little interest or pleasure in doing things 0   Q2: Feeling down, depressed or hopeless 0   PHQ-2 Score 0     Today's PHQ-9 Score:   PHQ-9 SCORE 2017   Total Score 0     Today's ALEC-7 Score:   ALEC-7 SCORE 2017   Total Score 0       Problem list and histories reviewed & adjusted, as indicated.  Additional history: as documented.    Patient Active Problem List   Diagnosis     Essential hypertension     Cerebral infarction (H)     Hypotension, postural     Cervical high risk HPV (human papillomavirus) test positive     Past Surgical History:   Procedure Laterality Date     NO HISTORY OF SURGERY        Social History   Substance Use Topics     Smoking status: Former Smoker     Smokeless tobacco: Never Used     Alcohol use 0.0 oz/week     0 Standard drinks or equivalent  "per week      Problem (# of Occurrences) Relation (Name,Age of Onset)    Anxiety Disorder (1) Daughter    Colon Cancer (1) Paternal Grandfather (60)    DIABETES (1) Mother    Endometrial Cancer (1) Sister (50)    Hypertension (1) Mother    OSTEOPOROSIS (1) Father            Current Outpatient Prescriptions   Medication Sig     omeprazole (PRILOSEC) 20 MG CR capsule Take 20 mg by mouth     Cyanocobalamin (VITAMIN B-12 PO)      VITAMIN D, CHOLECALCIFEROL, PO Take by mouth daily     ASPIRIN PO Take  by mouth.       quinapril (ACCUPRIL) 40 MG tablet TAKE ONE TABLET BY MOUTH EVERY DAY     atenolol (TENORMIN) 50 MG tablet Take 0.5 tablets by mouth daily.     No current facility-administered medications for this visit.      No Known Allergies    ROS:  12 point review of systems negative other than symptoms noted below.  Gastrointestinal: Heartburn  Genitourinary: Pelvic Pain    OBJECTIVE:     /86  Ht 5' 3.5\" (1.613 m)  Wt 130 lb 3.2 oz (59.1 kg)  BMI 22.7 kg/m2  Body mass index is 22.7 kg/(m^2).    Exam:  Constitutional:  Appearance: Well nourished, well developed alert, in no acute distress  Chest:  Respiratory Effort:  Breathing unlabored  Breasts:  Inspection of Breasts:  Left breast near areola is noted a 5mm flat rough light brown lesion  Neurologic/Psychiatric:  Mental Status:  Oriented X3        ASSESSMENT/PLAN:                                                        ICD-10-CM    1. Endometrial polyp N84.0    2. Thickened endometrium R93.8    3. Postmenopausal bleeding N95.0    4. Chest skin lesion L98.9    5. Pain in joint involving pelvic region and thigh, unspecified laterality M25.559        -Reviewed EMB path-endometrial polyp, may explain the thickened endometrium and focal fundal finding on US. Discussed what polyps are and the natural history and treatments. Though this is not likely causing her pelvic discomfort, would recommend removal of the polyp at some point. Can perform office hysteroscopy and " resector polypectomy. Reviewed these procedures and aftercare.   -For pelvic pain, rec PT. Claudio is MSK related. Reviewed US otherwise wnl.   -Due for skin check, would rec she make appt. Lesion appears c/w actinic keratosis, not melanoma as she is concerned about. However, as it is symptomatic, may need to come off.   -Reviewed diet/lifestyle changes for improving GERD. Consider food diary. If still having issues, return to her IM doc for f/u.  Questions answered, pt happy with plan    Morelia Crabtree,   [facility]        Please schedule for surgery:    Patient Name:  Kristi Aguero ([id number]).  :  1955      Physician requests assist from:  None  Requested Dates:  Per pt availability, likely w/in next 2 mo  Schedule based on:  same  Amount of time needed for the procedure:  1hr   Expected time off from work:  1d  Surgeon:  Morelia Crabtree, DO  Surgery permit to read:  Hysteroscopy, endometrial polypectomy, directed endometrial biopsies  Preop Needed:  No  Location for surgery to performed:   In Clinic  Anesthesia:  local   No Known Allergies  Nickel allergy:  Not Applicable  EMB: endometrial polyp    DIAGNOSIS:  Endometrial polyp, thickened endometrium, recurrent postmenopausal bleeding    Special instructions:  Resectr; pt will need   Vendor Rep:  yes  Meds Needed: XANAX: 0.5mg 1 hour prior to procedure, OXYCODONE: 5mg 1 hour before procedure; 4 available post procedure; quantity = 5 and TORADOL: 60mg IM 30-45 minutes before procedure

## 2018-03-05 NOTE — MR AVS SNAPSHOT
"              After Visit Summary   3/5/2018    Kristi Aguero    MRN: [id number]           Patient Information     Date Of Birth          1955        Visit Information        Provider Department      3/5/2018 9:00 AM Morelia Crabtree DO [facility]        Today's Diagnoses     Endometrial polyp    -  1    Thickened endometrium        Postmenopausal bleeding        Chest skin lesion        Pain in joint involving pelvic region and thigh, unspecified laterality        Gastroesophageal reflux disease without esophagitis           Follow-ups after your visit        Who to contact     If you have questions or need follow up information about today's clinic visit or your schedule please contact [facility] directly at [phone].  Normal or non-critical lab and imaging results will be communicated to you by MyChart, letter or phone within 4 business days after the clinic has received the results. If you do not hear from us within 7 days, please contact the clinic through MyChart or phone. If you have a critical or abnormal lab result, we will notify you by phone as soon as possible.  Submit refill requests through Caspida or call your pharmacy and they will forward the refill request to us. Please allow 3 business days for your refill to be completed.          Additional Information About Your Visit        MyChart Information     Caspida lets you send messages to your doctor, view your test results, renew your prescriptions, schedule appointments and more. To sign up, go to [email or web address] . Click on \"Log in\" on the left side of the screen, which will take you to the Welcome page. Then click on \"Sign up Now\" on the right side of the page.     You will be asked to enter the access code listed below, as well as some personal information. Please follow the directions to create your username and password.     Your access code is: [email or web address]  Expires: 5/16/2018 11:12 " "AM     Your access code will  in 90 days. If you need help or a new code, please call your [place] clinic or [phone].        Care EveryWhere ID     This is your Care EveryWhere ID. This could be used by other organizations to access your [place] medical records  [id number]        Your Vitals Were     Height BMI (Body Mass Index)                5' 3.5\" (1.613 m) 22.7 kg/m2           Blood Pressure from Last 3 Encounters:   18 142/86   18 138/80   02/15/18 132/84    Weight from Last 3 Encounters:   18 130 lb 3.2 oz (59.1 kg)   18 130 lb 9.6 oz (59.2 kg)   02/15/18 131 lb (59.4 kg)              Today, you had the following     No orders found for display       Primary Care Provider Office Phone # Fax #    Saji An [phone]       [place] [address]        Equal Access to Services     [facility]: Hadii aad ku hadasho Soomaali, waaxda luqadaha, qaybta kaalmada adeegyada, waxay thierry hayashley rosa . So [facility] [place] [facility] [phone].    ATENCIÓN: Si habla español, tiene a gaspar disposición servicios gratuitos de asistencia lingüística. Llame al [phone].    We comply with applicable federal civil rights laws and [place] laws. We do not discriminate on the basis of race, color, national origin, age, disability, sex, sexual orientation, or gender identity.            Thank you!     Thank you for choosing [facility] [place]  for your care. Our goal is always to provide you with excellent care. Hearing back from our patients is one way we can continue to improve our services. Please take a few minutes to complete the written survey that you may receive in the mail after your visit with us. Thank you!             Your Updated Medication List - Protect others around you: Learn how to safely use, store and throw away your medicines at www.disposemymeds.org.          This list is accurate as of " 3/5/18  9:21 PM.  Always use your most recent med list.                   Brand Name Dispense Instructions for use Diagnosis    ASPIRIN PO      Take  by mouth.        atenolol 50 MG tablet    TENORMIN    90 tablet    Take 0.5 tablets by mouth daily.    Unspecified essential hypertension       omeprazole 20 MG CR capsule    priLOSEC     Take 20 mg by mouth        quinapril 40 MG Tab    ACCUPRIL    90 tablet    TAKE ONE TABLET BY MOUTH EVERY DAY    Muscle cramps       VITAMIN B-12 PO           VITAMIN D (CHOLECALCIFEROL) PO      Take by mouth daily

## 2018-03-06 ENCOUNTER — TELEPHONE (OUTPATIENT)
Dept: OBGYN | Facility: CLINIC | Age: 63
End: 2018-03-06

## 2018-03-06 NOTE — TELEPHONE ENCOUNTER
Patient Name:  Kristi Aguero ([id number]).  :  1955        Physician requests assist from:  None  Requested Dates:  Per pt availability, likely w/in next 2 mo  Schedule based on:  same  Amount of time needed for the procedure:  1hr               Expected time off from work:  1d  Surgeon:  Morelia Crabtree, DO  Surgery permit to read:  Hysteroscopy, endometrial polypectomy, directed endometrial biopsies  Preop Needed:  No  Location for surgery to performed:   In Clinic  Anesthesia:  local   No Known Allergies  Nickel allergy:  Not Applicable  EMB: endometrial polyp     DIAGNOSIS:  Endometrial polyp, thickened endometrium, recurrent postmenopausal bleeding     Special instructions:  Resectr; pt will need   Vendor Rep:  yes  Meds Needed: XANAX: 0.5mg 1 hour prior to procedure, OXYCODONE: 5mg 1 hour before procedure; 4 available post procedure; quantity = 5 and TORADOL: 60mg IM 30-45 minutes before procedure

## 2018-03-14 NOTE — TELEPHONE ENCOUNTER
RESECTR rep emailed  Form ready for mail requested RXs from Dr Crabtree  Will mail all tomorrow    EXCEL updated    Chela Hutton  Surgery Scheduler

## 2018-03-15 DIAGNOSIS — N95.0 POSTMENOPAUSAL BLEEDING: Primary | ICD-10-CM

## 2018-03-15 DIAGNOSIS — N84.0 ENDOMETRIAL POLYP: ICD-10-CM

## 2018-03-15 RX ORDER — ALPRAZOLAM 0.5 MG
0.5 TABLET ORAL ONCE
Qty: 1 TABLET | Refills: 0 | Status: SHIPPED | OUTPATIENT
Start: 2018-03-15 | End: 2018-03-15

## 2018-03-15 RX ORDER — OXYCODONE HYDROCHLORIDE 5 MG/1
5 TABLET ORAL EVERY 4 HOURS PRN
Qty: 5 TABLET | Refills: 0 | Status: SHIPPED | OUTPATIENT
Start: 2018-03-15 | End: 2018-05-08

## 2018-04-10 ENCOUNTER — ALLIED HEALTH/NURSE VISIT (OUTPATIENT)
Dept: NURSING | Facility: CLINIC | Age: 63
End: 2018-04-10
Payer: COMMERCIAL

## 2018-04-10 ENCOUNTER — OFFICE VISIT (OUTPATIENT)
Dept: OBGYN | Facility: CLINIC | Age: 63
End: 2018-04-10
Payer: COMMERCIAL

## 2018-04-10 ENCOUNTER — APPOINTMENT (OUTPATIENT)
Dept: NURSING | Facility: CLINIC | Age: 63
End: 2018-04-10
Payer: COMMERCIAL

## 2018-04-10 DIAGNOSIS — N84.0 UTERINE POLYP: Primary | ICD-10-CM

## 2018-04-10 DIAGNOSIS — R93.89 THICKENED ENDOMETRIUM: ICD-10-CM

## 2018-04-10 DIAGNOSIS — N84.0 ENDOMETRIAL POLYP: Primary | ICD-10-CM

## 2018-04-10 PROCEDURE — 58558 HYSTEROSCOPY BIOPSY: CPT | Performed by: OBSTETRICS & GYNECOLOGY

## 2018-04-10 PROCEDURE — 96372 THER/PROPH/DIAG INJ SC/IM: CPT

## 2018-04-10 NOTE — NURSING NOTE
Pre-procedure Medications: Xanax and Oxycodone taken at 1225  Pre-Procedure BP: 161/82, pulse 69.   Repeat /76, pulse 69  Temp: 36.7    LMP: post menopausal    Serum Pregnancy Test: Not done  Allergies: no allergies    IF ALLERGIC TO ASA OR NSAIDS DO NOT GIVE TORADOL  Toradol injection: 60 mg IM  Time given: 1245  Site: LUQ - Gluteus  Lot #: [id number]  Expiration Date:  6/18  NDC: 25483-739-76    Anesthesia Used: Lidocaine 1%  Amount given: 20 cc.    Expiration Date: 7/1/2019  NDC:8489-9010-13      Post Procedure Blood Pressure: 146/87 , pulse 67  Comments: Pt tolerated well. Able to ambulate without difficulty. Discharge instructions given to pt. Pt discharged to home in stable condition.     Discharge instructions given: Yes    RN:  Mere Ibanez RN   Surgeon: Dr. ROLANDO Currys

## 2018-04-10 NOTE — PROGRESS NOTES
HYSTEROSCOPY, AND ENDOMETRIAL POLYPECTOMY OPERATIVE NOTE    Preoperative Diagnosis: Thickened endometrium, endometrial polyp  Postoperative Diagnosis: same  Procedure(s): Hysteroscopy, endometrial polypectomy  Surgeon: ROSALEE Crabtree DO  Type of anesthesia:  Local  Complications: None  EBL:  5 cc  Fluid deficit: 100 cc  Findings: Thin, age appropriate endometrium; posterior fundal broad based polyp vs fibroid  Specimen(s) removed: endometrial polyp    Indications:   62yo PM female who was undergoing evaluation for new onset pelvic pain, found on US to have thickened endometrium with ES 5.8mm and endometrial polyp with benign tissue on office EMB.  Risks, benefits and alternative treatments have been discussed with the patient. Informed consent obtained.     Procedure:   The patient was premedicated with Toradol, vicodin and xanax. She was taken to the procedure room where a bivalve speculum was placed and she was prepared in the normal sterile fashion in the dorsal lithotomy position. Paracervical block with 1% lidocaine with epinephrine was administered.  A bivalve speculum was inserted in the vagina. A single tooth tenaculum was used to grasp the anterior lip of the cervix. The priscila dilators were used to dilate the cervix. The hysteroscope was inserted and the uterine cavity explored. The above findings were noted. The Resectr device then inserted and removal of the endometrial pathology performed. It was noted to be quite tough, thick tissue especially at the base raising the question of fibroid. Samples of other areas, randomly, of uterus were attempted, there was very little tissue to be attained due to postmenopausal status. Specimen sent to pathology. All instruments were then removed. Tenaculum sites appeared to be hemostatic with pressure. The patient tolerated the procedure well.     Morelia Crabtree DO  April 10, 2018  2:29 PM

## 2018-04-10 NOTE — MR AVS SNAPSHOT
"              After Visit Summary   4/10/2018    Kristi Aguero    MRN: [id number]           Patient Information     Date Of Birth          1955        Visit Information        Provider Department      4/10/2018 12:15 PM WE TRIAGE [facility] Arnold        Today's Diagnoses     Uterine polyp    -  1       Follow-ups after your visit        Who to contact     If you have questions or need follow up information about today's clinic visit or your schedule please contact [facility] directly at [phone].  Normal or non-critical lab and imaging results will be communicated to you by MyChart, letter or phone within 4 business days after the clinic has received the results. If you do not hear from us within 7 days, please contact the clinic through Mashapehart or phone. If you have a critical or abnormal lab result, we will notify you by phone as soon as possible.  Submit refill requests through Dustcloud or call your pharmacy and they will forward the refill request to us. Please allow 3 business days for your refill to be completed.          Additional Information About Your Visit        MyChart Information     Dustcloud lets you send messages to your doctor, view your test results, renew your prescriptions, schedule appointments and more. To sign up, go to [email or web address] . Click on \"Log in\" on the left side of the screen, which will take you to the Welcome page. Then click on \"Sign up Now\" on the right side of the page.     You will be asked to enter the access code listed below, as well as some personal information. Please follow the directions to create your username and password.     Your access code is: [email or web address]  Expires: 2018 12:12 PM     Your access code will  in 90 days. If you need help or a new code, please call your [facility] or [phone].        Care EveryWhere ID     This is your Care EveryWhere ID. This could be used by other organizations to " access your [place] medical records  [id number]         Blood Pressure from Last 3 Encounters:   03/05/18 142/86   02/19/18 138/80   02/15/18 132/84    Weight from Last 3 Encounters:   03/05/18 130 lb 3.2 oz (59.1 kg)   02/19/18 130 lb 9.6 oz (59.2 kg)   02/15/18 131 lb (59.4 kg)              Today, you had the following     No orders found for display       Primary Care Provider Office Phone # Fax #    Saji An [phone]       [place] [address]        Equal Access to Services     ADILSON PANDEY : Hadii junior Love, waaxda carl, qaybta kaalmada marcela, sanket cedeno. So [facility] [phone].    ATENCIÓN: Si habla español, tiene a gaspar disposición servicios gratuitos de asistencia lingüística. Llame al [phone].    We comply with applicable federal civil rights laws and [place] laws. We do not discriminate on the basis of race, color, national origin, age, disability, sex, sexual orientation, or gender identity.            Thank you!     Thank you for choosing [facility] [place]  for your care. Our goal is always to provide you with excellent care. Hearing back from our patients is one way we can continue to improve our services. Please take a few minutes to complete the written survey that you may receive in the mail after your visit with us. Thank you!             Your Updated Medication List - Protect others around you: Learn how to safely use, store and throw away your medicines at www.disposemymeds.org.          This list is accurate as of 4/10/18  2:23 PM.  Always use your most recent med list.                   Brand Name Dispense Instructions for use Diagnosis    ASPIRIN PO      Take  by mouth.        atenolol 50 MG tablet    TENORMIN    90 tablet    Take 0.5 tablets by mouth daily.    Unspecified essential hypertension       omeprazole 20 MG CR capsule    priLOSEC     Take  20 mg by mouth        oxyCODONE IR 5 MG tablet    ROXICODONE    5 tablet    Take 1 tablet (5 mg) by mouth every 4 hours as needed for pain . Bring to office for administration preprocedure. To be taken one hour prior to procedure.    Postmenopausal bleeding, Endometrial polyp       quinapril 40 MG Tab    ACCUPRIL    90 tablet    TAKE ONE TABLET BY MOUTH EVERY DAY    Muscle cramps       VITAMIN B-12 PO           VITAMIN D (CHOLECALCIFEROL) PO      Take by mouth daily

## 2018-04-10 NOTE — MR AVS SNAPSHOT
"              After Visit Summary   4/10/2018    Kristi Aguero    MRN: [id number]           Patient Information     Date Of Birth          1955        Visit Information        Provider Department      4/10/2018 12:30 PM Morelia Crabtree, ; WE PROC [facility]        Today's Diagnoses     Endometrial polyp    -  1    Thickened endometrium           Follow-ups after your visit        Who to contact     If you have questions or need follow up information about today's clinic visit or your schedule please contact [facility] directly at [phone].  Normal or non-critical lab and imaging results will be communicated to you by [email or web address], letter or phone within 4 business days after the clinic has received the results. If you do not hear from us within 7 days, please contact the clinic through [email or web address] or phone. If you have a critical or abnormal lab result, we will notify you by phone as soon as possible.  Submit refill requests through IdeaOffer or call your pharmacy and they will forward the refill request to us. Please allow 3 business days for your refill to be completed.          Additional Information About Your Visit        MyChart Information     IdeaOffer lets you send messages to your doctor, view your test results, renew your prescriptions, schedule appointments and more. To sign up, go to [email or web address] . Click on \"Log in\" on the left side of the screen, which will take you to the Welcome page. Then click on \"Sign up Now\" on the right side of the page.     You will be asked to enter the access code listed below, as well as some personal information. Please follow the directions to create your username and password.     Your access code is: [email or web address]  Expires: 2018 12:12 PM     Your access code will  in 90 days. If you need help or a new code, please call your [place] clinic or [phone].        Care EveryWhere ID     This is your Care " EveryWhere ID. This could be used by other organizations to access your [place] medical records  [id number]         Blood Pressure from Last 3 Encounters:   03/05/18 142/86   02/19/18 138/80   02/15/18 132/84    Weight from Last 3 Encounters:   03/05/18 130 lb 3.2 oz (59.1 kg)   02/19/18 130 lb 9.6 oz (59.2 kg)   02/15/18 131 lb (59.4 kg)              We Performed the Following     HYSTEROSCOPY W ENDOMETRIAL BX/POLYPECTOMY W/WO D&C        Primary Care Provider Office Phone # Fax #    Saji An [phone]       [place] [address]        Equal Access to Services     NICHOLAS PANDEY : Hadii junior alcarazo Soerik, waaxda luqadaha, qaybta kaalmada adeegyada, sanket cedeno. So [facility] [phone].    ATENCIÓN: Si habla español, tiene a gaspar disposición servicios gratuitos de asistencia lingüística. LlPrem[facility] [phone].    We comply with applicable federal civil rights laws and [place] laws. We do not discriminate on the basis of race, color, national origin, age, disability, sex, sexual orientation, or gender identity.            Thank you!     Thank you for choosing [facility] [place]  for your care. Our goal is always to provide you with excellent care. Hearing back from our patients is one way we can continue to improve our services. Please take a few minutes to complete the written survey that you may receive in the mail after your visit with us. Thank you!             Your Updated Medication List - Protect others around you: Learn how to safely use, store and throw away your medicines at www.disposemymeds.org.          This list is accurate as of 4/10/18  2:39 PM.  Always use your most recent med list.                   Brand Name Dispense Instructions for use Diagnosis    ASPIRIN PO      Take  by mouth.        atenolol 50 MG tablet    TENORMIN    90 tablet    Take 0.5 tablets by mouth daily.     Unspecified essential hypertension       omeprazole 20 MG CR capsule    priLOSEC     Take 20 mg by mouth        oxyCODONE IR 5 MG tablet    ROXICODONE    5 tablet    Take 1 tablet (5 mg) by mouth every 4 hours as needed for pain . Bring to office for administration preprocedure. To be taken one hour prior to procedure.    Postmenopausal bleeding, Endometrial polyp       quinapril 40 MG Tab    ACCUPRIL    90 tablet    TAKE ONE TABLET BY MOUTH EVERY DAY    Muscle cramps       VITAMIN B-12 PO           VITAMIN D (CHOLECALCIFEROL) PO      Take by mouth daily

## 2018-04-12 PROCEDURE — 88305 TISSUE EXAM BY PATHOLOGIST: CPT | Performed by: OBSTETRICS & GYNECOLOGY

## 2018-04-13 LAB — COPATH REPORT: NORMAL

## 2018-04-14 ENCOUNTER — NURSE TRIAGE (OUTPATIENT)
Dept: NURSING | Facility: CLINIC | Age: 63
End: 2018-04-14

## 2018-04-14 NOTE — TELEPHONE ENCOUNTER
"Patient states she had a polyp removed on 4/10/18 and just a few hours ago, started having vaginal bleeding, \"like a period\".  Patient is out of town and doesn't have paperwork from the visit with her.  FNA doesn't see any direction regarding when to contact PCP on AVS.  Paged on call, Dr. ROSALEE Moreno, at 2:04PM, via Smart Web, to contact patient directly at [phone].    Additional Information    Negative: Shock suspected (e.g., cold/pale/clammy skin, too weak to stand, low BP, rapid pulse)    Negative: Difficult to awaken or acting confused  (e.g., disoriented, slurred speech)    Negative: Passed out (i.e., lost consciousness, collapsed and was not responding)    Negative: Sounds like a life-threatening emergency to the triager    Negative: Followed a genital area injury    Negative: Pregnant > 20 weeks  (5 months or more)    Negative: Pregnant < 20 weeks  (less than 5 months)    Negative: Bleeding occurring > 12 months after menopause    Negative: Bleeding from sexual abuse or rape    Negative: [1] Vaginal discharge is main symptom AND [2] small amount of blood    Negative: SEVERE abdominal pain    Negative: SEVERE dizziness (e.g., unable to stand, requires support to walk, feels like passing out now)    Negative: SEVERE vaginal bleeding (i.e., soaking 2 pads or tampons per hour and present 2 or more hours)    Negative: Patient sounds very sick or weak to the triager    Negative: MODERATE vaginal bleeding (i.e., soaking 1 pad or tampon per hour and present > 6 hours)    Negative: [1] Constant abdominal pain AND [2] present > 2 hours    Negative: Pale skin (pallor) of new onset or worsening    Negative: Passed tissue (e.g., gray-white)    Negative: Taking Coumadin (warfarin) or other strong blood thinner, or known bleeding disorder (e.g., thrombocytopenia)    Negative: [1] Skin bruises or nosebleed AND [2] not caused by an injury    Negative: [1] Periods with > 6 soaked pads or tampons per day AND [2] last > 7 " days    Negative: [1] Bleeding or spotting after procedure (e.g., biopsy) or pelvic examination (e.g., pap smear) AND [2] lasts > 7 days    Negative: [1] Bleeding or spotting occurs after sex (Exception: first intercourse) AND [2] lasts > 7 days    Negative: Periods with > 6 soaked pads or tampons per day    Negative: Periods last > 7 days    Negative: [1] Missed period AND [2] has occurred 2 or more times in the last year    Negative: [1] Menstrual cycle < 21 days OR > 35 days AND [2] occurs more than two cycles (2 months) this past year    Negative: [1] Bleeding or spotting between regular periods AND [2] occurs more than two cycles (2 months) this past year    Negative: [1] Bleeding or spotting between regular periods AND [2] occurs more than two cycles (2 months) AND [3] using birth control medicine (pills, patch, Depo-Provera, Implanon, vaginal ring, Mirena IUD)    Negative: Bleeding or spotting occurs after hysterectomy    Negative: Normal menstrual flow (all triage questions negative)    Negative: [1] Menstrual cycle < 21 days OR > 35 days AND [2] has occurred once this past year   (all triage questions negative)    Negative: [1] MILD bleeding or SPOTTING AND [2] could be pregnant  (e.g., missed last period)   (all triage questions negative)    Negative: [1] MILD  bleeding or SPOTTING AND [2] after procedure (e.g., biopsy) or pelvic examination (e.g., pap smear) AND [2] < 7 days   (all triage questions negative)    Negative: [1] MILD  bleeding or SPOTTING AND [2] immediately follows first intercourse  (all triage questions negative)    Negative: Missed Combined Hormone Birth Control Pill - Placebo or Reminder Pill    Negative: Missed Combined Hormone Birth Control Pill - Active Hormone Pill    Negative: Missed Progestin-Only Pill (POP) or Took it Late    Negative: Using birth control pills    Negative: Has Implanon subdermal implant    Negative: Using Depo-Provera injections    Negative: Using birth control  patch (i.e., transdermal contraceptive system)    Negative: Using vaginal contraceptive ring (i.e., NuvaRing)    Negative: Using Mirena IUD (a special IUD that releases the hormone progestin)    Protocols used: VAGINAL BLEEDING - ABNORMAL-ADULT-AH

## 2018-04-16 ENCOUNTER — MYC MEDICAL ADVICE (OUTPATIENT)
Dept: OBGYN | Facility: CLINIC | Age: 63
End: 2018-04-16

## 2018-04-16 DIAGNOSIS — Z80.49 FAMILY HISTORY OF MALIGNANT NEOPLASM OF ENDOMETRIUM: ICD-10-CM

## 2018-04-16 DIAGNOSIS — N84.0 ENDOMETRIUM, POLYP: Primary | ICD-10-CM

## 2018-05-04 ENCOUNTER — CARE COORDINATION (OUTPATIENT)
Dept: ONCOLOGY | Facility: CLINIC | Age: 63
End: 2018-05-04

## 2018-05-04 NOTE — PROGRESS NOTES
Phoned patient today for Pre-visit welcome call.  Patient is available and appt confirmed.    Scheduled for:  Date:  5/8/18   Time: 1 pm  with :  Nicko    Patient requested:  Please check in at [address]  [facility].  Please Arrive 15 minutes before your appointment time.    Please Bring:  Insurance Card(s)  Photo ID      Patient is [place] patient and all records, pathology reports, surgical reports are in EMR.  MD will be able to preview chart prior to visit.  Patient is aware that parking is free in both an open or covered lot.  First visit will take last approx 30-60 min.  Dr. Maharaj will do an exam.   Feel free to bring along family/SO /friends to your appt to help absorb all  Information.   he clinic is open M-F 8-4:30pm and I am here the same hours.   If you have any questions, please don't hesitate to call and ask for the Patient Care Coordinator @ [phone] option 3.     Richa Javier RN, BSN

## 2018-05-08 ENCOUNTER — ONCOLOGY VISIT (OUTPATIENT)
Dept: ONCOLOGY | Facility: CLINIC | Age: 63
End: 2018-05-08
Attending: OBSTETRICS & GYNECOLOGY
Payer: COMMERCIAL

## 2018-05-08 VITALS
WEIGHT: 127 LBS | RESPIRATION RATE: 16 BRPM | TEMPERATURE: 98.4 F | SYSTOLIC BLOOD PRESSURE: 136 MMHG | BODY MASS INDEX: 21.68 KG/M2 | OXYGEN SATURATION: 98 % | HEIGHT: 64 IN | HEART RATE: 58 BPM | DIASTOLIC BLOOD PRESSURE: 82 MMHG

## 2018-05-08 DIAGNOSIS — N84.0 ENDOMETRIAL POLYP: Primary | ICD-10-CM

## 2018-05-08 DIAGNOSIS — R87.810 CERVICAL HIGH RISK HPV (HUMAN PAPILLOMAVIRUS) TEST POSITIVE: ICD-10-CM

## 2018-05-08 DIAGNOSIS — Z80.49 FAMILY HISTORY OF MALIGNANT NEOPLASM OF ENDOMETRIUM: ICD-10-CM

## 2018-05-08 PROCEDURE — 99203 OFFICE O/P NEW LOW 30 MIN: CPT | Performed by: OBSTETRICS & GYNECOLOGY

## 2018-05-08 PROCEDURE — G0463 HOSPITAL OUTPT CLINIC VISIT: HCPCS

## 2018-05-08 ASSESSMENT — PAIN SCALES - GENERAL: PAINLEVEL: NO PAIN (0)

## 2018-05-08 NOTE — MR AVS SNAPSHOT
After Visit Summary   5/8/2018    Kristi Aguero    MRN: [id number]           Patient Information     Date Of Birth          1955        Visit Information        Provider Department      5/8/2018 1:00 PM Monica Rao MD [facility]        Care Instructions    Genetic counseling scheduled [facility]          Follow-ups after your visit        Your next 10 appointments already scheduled     Jul 31, 2018  9:00 AM CDT   New Visit with Lucrecia Caro GC   Cancer Risk Management Program ([facility])    [facility]  [address]  [facility] [address]  [facility] [id number] [phone]              Who to contact     If you have questions or need follow up information about today's clinic visit or your schedule please contact [facility] directly at [phone].  Normal or non-critical lab and imaging results will be communicated to you by MyChart, letter or phone within 4 business days after the clinic has received the results. If you do not hear from us within 7 days, please contact the clinic through MyChart or phone. If you have a critical or abnormal lab result, we will notify you by phone as soon as possible.  Submit refill requests through [employer] or call your pharmacy and they will forward the refill request to us. Please allow 3 business days for your refill to be completed.          Additional Information About Your Visit        MyChart Information     [employer] gives you secure access to your electronic health record. If you see a primary care provider, you can also send messages to your care team and make appointments. If you have questions, please call your primary care clinic.  If you do not have a primary care provider, please call [phone] and they will assist you.        Care EveryWhere ID     This is your Care EveryWhere ID. This could be used by other organizations to access your [facility]  "records  [id number]        Your Vitals Were     Pulse Temperature Respirations Height Pulse Oximetry BMI (Body Mass Index)    58 98.4  F (36.9  C) (Tympanic) 16 1.613 m (5' 3.5\") 98% 22.14 kg/m2       Blood Pressure from Last 3 Encounters:   05/08/18 136/82   03/05/18 142/86   02/19/18 138/80    Weight from Last 3 Encounters:   05/08/18 57.6 kg (127 lb)   03/05/18 59.1 kg (130 lb 3.2 oz)   02/19/18 59.2 kg (130 lb 9.6 oz)              Today, you had the following     No orders found for display         Today's Medication Changes          These changes are accurate as of 5/8/18  1:33 PM.  If you have any questions, ask your nurse or doctor.               Stop taking these medicines if you haven't already. Please contact your care team if you have questions.     oxyCODONE IR 5 MG tablet   Commonly known as:  ROXICODONE   Stopped by:  Monica Rao MD                    Primary Care Provider Office Phone # Fax #    Saji An [phone]       [place] [address]        Equal Access to Services     NICHOLAS PANDEY AH: Hadii junior edwards hadasho Soomaali, waaxda luqadaha, qaybta kaalmada adeegyada, waxay thierry cedeno. So [facility] [phone].    ATENCIÓN: Si habla español, tiene a gaspar disposición servicios gratuitos de asistencia lingüística. ame al [phone].    We comply with applicable federal civil rights laws and [place] laws. We do not discriminate on the basis of race, color, national origin, age, disability, sex, sexual orientation, or gender identity.            Thank you!     Thank you for choosing [facility]  for your care. Our goal is always to provide you with excellent care. Hearing back from our patients is one way we can continue to improve our services. Please take a few minutes to complete the written survey that you may receive in the mail after your visit with us. Thank you!      "        Your Updated Medication List - Protect others around you: Learn how to safely use, store and throw away your medicines at www.disposemymeds.org.          This list is accurate as of 5/8/18  1:33 PM.  Always use your most recent med list.                   Brand Name Dispense Instructions for use Diagnosis    ASPIRIN PO      Take  by mouth.        atenolol 50 MG tablet    TENORMIN    90 tablet    Take 0.5 tablets by mouth daily.    Unspecified essential hypertension       omeprazole 20 MG CR capsule    priLOSEC     Take 20 mg by mouth        quinapril 40 MG Tab    ACCUPRIL    90 tablet    TAKE ONE TABLET BY MOUTH EVERY DAY    Muscle cramps       VITAMIN B-12 PO           VITAMIN D (CHOLECALCIFEROL) PO      Take by mouth daily

## 2018-05-08 NOTE — PROGRESS NOTES
Consult Notes on Referred Patient        Dr. Morelia Teran Masters, DO  [address] [facility] [address]  [place] [address]       RE: Kristi Aguero  : 1955  DI: 2018    Dear Dr. Morelia Teran Masters:    I had the pleasure of seeing your patient Kristi Aguero here at the Gynecologic Cancer Clinic at the [facility] on 2018.  As you know she is a very pleasant 63 year old woman with a recent diagnosis of endometrial polyp.  Given these findings she was subsequently sent to the Gynecologic Cancer Clinic for new patient consultation.  She is unaccompanied today.      Patient initially presented with lower abdominal/pelvic pain.  She underwent an US and was found to have a benign endometrial polyp.  She also has the sensation that she is sitting on something. She denies any bleeding.  She is undergoing an extensive workup for her abdominal pain and has an upper endoscopy scheduled tomorrow.    18:  EMB:  Benign endometrial polyp    18:  US pelvis:  Uterus measures 5.1 x 3.9 x 3.2 cm with EMS of 5.9mm.  Normal adnexa    18:  Office hysteroscopy:  Benign endometrial polyp      Review of Systems:  Systemic           no weight changes; no fever; no chills; + night sweats; no appetite changes  Skin           no rashes, or lesions  Eye           no irritation; no changes in vision  Ham-Laryngeal           no dysphagia; no hoarseness   Pulmonary    no cough; no shortness of breath  Cardiovascular    no chest pain; + palpitations  Gastrointestinal    no diarrhea; no constipation; + abdominal pain; no changes in bowel  habits; no blood in stool; + heartburn  Genitourinary   no urinary frequency; no urinary urgency; no dysuria; no pain; no abnormal vaginal discharge; no abnormal vaginal bleeding  Breast    no breast discharge; no breast changes; no breast pain  Musculoskeletal    no myalgias; no arthralgias; + back pain; + joint pain  Psychiatric           no depressed mood; no anxiety    Hematologic  "           no tender lymph nodes; no noticeable swellings or lumps   Endocrine    no hot flashes; no heat/cold intolerance         Neurological   no tremor; no numbness and tingling; no headaches; no difficulty sleeping    Obstetrics and Gynecology History:  ,  x 3  No HRT use      Past Medical History:  Past Medical History:   Diagnosis Date     Cervical high risk HPV (human papillomavirus) test positive 2017 NIL/+ HR HPV (not 16 or 18). Plan: cotest in 1 year     CVA (cerebral infarction)     possible, details of stroke:  lasted 3-4 minutes, Left arm/face.  MRI revealed \"Stroke.\"  Occurred shortly after birth of her youngest.     Hypotension, postural      Unspecified cerebral artery occlusion with cerebral infarction      Unspecified essential hypertension        Past Surgical History:  Past Surgical History:   Procedure Laterality Date     NO HISTORY OF SURGERY         Health Maintenance:  Last Pap Smear: 17              Result: normal but other HR HPV+  She has not had a history of abnormal Pap smears.    Last Mammogram: 17              Result: normal      She has not had a history of abnormal mammograms.    Last Colonoscopy:               Result: polyp      Current Medications:   has a current medication list which includes the following prescription(s): aspirin, atenolol, cyanocobalamin, omeprazole, quinapril, and cholecalciferol.     Allergies:   Review of patient's allergies indicates no known allergies.      Social History:  Social History     Social History     Marital status:      Spouse name: N/A     Number of children: 3     Years of education: N/A     Occupational History     Not on file.     Social History Main Topics     Smoking status: Former Smoker     Smokeless tobacco: Never Used     Alcohol use 0.0 oz/week     0 Standard drinks or equivalent per week     Drug use: Not on file     Sexual activity: Not Currently     Partners: Male     Birth " "control/ protection: Post-menopausal     Other Topics Concern     Not on file     Social History Narrative     Fabio had stage 4 kidney cancer, metastatic to the brain,  2010.       Family History:   The patient's family history is significant for.  Family History   Problem Relation Age of Onset     Anxiety Disorder Daughter      Colon Cancer Paternal Grandfather 60     DIABETES Mother      Hypertension Mother      OSTEOPOROSIS Father      Endometrial Cancer Sister 50         Physical Exam:   /82  Pulse 58  Temp 98.4  F (36.9  C) (Tympanic)  Resp 16  Ht 1.613 m (5' 3.5\")  Wt 57.6 kg (127 lb)  SpO2 98%  BMI 22.14 kg/m2  Body mass index is 22.14 kg/(m^2).    General Appearance: healthy and alert, no distress     Assessment:    Kristi Aguero is a 63 year old woman with a new diagnosis of endometrial polyp in the setting of a sister with endometrial cancer.     A total of 30 minutes was spent with the patient, >50% of which were spent in counseling the patient and/or treatment planning.      Plan:     1.)    Endometrial polyp in the setting of a sister with endometrial cancer.  We discussed her endometrial polyp and that this was benign as most endometrial polyps are.  There is no further surveillance or screening that is necessary for this endometrial polyp but I did advise that if she were to ever develop PMB, even if it is just light or pink, that she should seek an appointment with her gynecologist for another EMB.  We discussed her sisters diagnosis of endometrial cancer was most likely sporadic but that with a paternal uncle also with colon cancer there is a small chance of there being Goldsmith syndrome in the family and thus I have referred her to genetic counseling.  She does not need further follow up with me unless genetic counseling reveals a mutation or she develops PMB.    2.) Other HR HPV+, will need repeat pap at 1 year so in 2-3 weeks which she will have with her primary " gynecologist     2.) Genetic risk factors were assessed and the patient does meet the qualifications for a referral and she will schedule.      3.) Labs and/or tests ordered include:  none.     4.) Health maintenance issues addressed today include pt is up to date.          Thank you for allowing us to participate in the care of your patient.         Sincerely,    Monica Maharaj MD  Gynecologic Oncology  [facility]       CC  MASTERS, PAM JOSE

## 2018-05-08 NOTE — NURSING NOTE
"Oncology Rooming Note    May 8, 2018 12:58 PM   Kristi Aguero is a 63 year old female who presents for:    Chief Complaint   Patient presents with     Oncology Clinic Visit     New Patient consult     Initial Vitals: /82  Pulse 58  Temp 98.4  F (36.9  C) (Tympanic)  Resp 16  Ht 1.613 m (5' 3.5\")  Wt 57.6 kg (127 lb)  SpO2 98%  BMI 22.14 kg/m2 Estimated body mass index is 22.14 kg/(m^2) as calculated from the following:    Height as of this encounter: 1.613 m (5' 3.5\").    Weight as of this encounter: 57.6 kg (127 lb). Body surface area is 1.61 meters squared.  No Pain (0) Comment: Data Unavailable   No LMP recorded. Patient is postmenopausal.  Allergies reviewed: Yes  Medications reviewed: Yes    Medications: Medication refills not needed today.  Pharmacy name entered into [employer]: [facility] [address] [place] [address] [id number] [place] [address] AT [address]    Clinical concerns: follow up     8 minutes for nursing intake (face to face time)     Ely Vasquez CMA     DISCHARGE PLAN:  Next appointments: See patient instruction section  Departure Mode: Ambulatory  Accompanied by: self  0 minutes for nursing discharge (face to face time)   Ely Vasquez CMA                  "

## 2018-05-08 NOTE — LETTER
2018         RE: Kristi Aguero  [address]  [place] [address]        Dear Colleague,    Thank you for referring your patient, Kristi Aguero, to the [facility]. Please see a copy of my visit note below.    Consult Notes on Referred Patient        Dr. Morelia Teran Masters, DO  [address]  [place] [address]       RE: Kristi Aguero  : 1955  DI: 2018    Dear Dr. Morelia Teran Masters:    I had the pleasure of seeing your patient Kristi Aguero here at the Gynecologic Cancer Clinic at the [facility] on 2018.  As you know she is a very pleasant 63 year old woman with a recent diagnosis of endometrial polyp.  Given these findings she was subsequently sent to the Gynecologic Cancer Clinic for new patient consultation.  She is unaccompanied today.      Patient initially presented with lower abdominal/pelvic pain.  She underwent an US and was found to have a benign endometrial polyp.  She also has the sensation that she is sitting on something. She denies any bleeding.  She is undergoing an extensive workup for her abdominal pain and has an upper endoscopy scheduled tomorrow.    18:  EMB:  Benign endometrial polyp    18:  US pelvis:  Uterus measures 5.1 x 3.9 x 3.2 cm with EMS of 5.9mm.  Normal adnexa    18:  Office hysteroscopy:  Benign endometrial polyp      Review of Systems:  Systemic           no weight changes; no fever; no chills; + night sweats; no appetite changes  Skin           no rashes, or lesions  Eye           no irritation; no changes in vision  Ham-Laryngeal           no dysphagia; no hoarseness   Pulmonary    no cough; no shortness of breath  Cardiovascular    no chest pain; + palpitations  Gastrointestinal    no diarrhea; no constipation; + abdominal pain; no changes in bowel  habits; no blood in stool; + heartburn  Genitourinary   no urinary frequency; no urinary urgency; no dysuria; no pain; no abnormal vaginal  "discharge; no abnormal vaginal bleeding  Breast    no breast discharge; no breast changes; no breast pain  Musculoskeletal    no myalgias; no arthralgias; + back pain; + joint pain  Psychiatric           no depressed mood; no anxiety    Hematologic            no tender lymph nodes; no noticeable swellings or lumps   Endocrine    no hot flashes; no heat/cold intolerance         Neurological   no tremor; no numbness and tingling; no headaches; no difficulty sleeping    Obstetrics and Gynecology History:  ,  x 3  No HRT use      Past Medical History:  Past Medical History:   Diagnosis Date     Cervical high risk HPV (human papillomavirus) test positive 2017 NIL/+ HR HPV (not 16 or 18). Plan: cotest in 1 year     CVA (cerebral infarction)     possible, details of stroke:  lasted 3-4 minutes, Left arm/face.  MRI revealed \"Stroke.\"  Occurred shortly after birth of her youngest.     Hypotension, postural      Unspecified cerebral artery occlusion with cerebral infarction      Unspecified essential hypertension        Past Surgical History:  Past Surgical History:   Procedure Laterality Date     NO HISTORY OF SURGERY         Health Maintenance:  Last Pap Smear: 17              Result: normal but other HR HPV+  She has not had a history of abnormal Pap smears.    Last Mammogram: 17              Result: normal      She has not had a history of abnormal mammograms.    Last Colonoscopy:               Result: polyp      Current Medications:   has a current medication list which includes the following prescription(s): aspirin, atenolol, cyanocobalamin, omeprazole, quinapril, and cholecalciferol.     Allergies:   Review of patient's allergies indicates no known allergies.      Social History:  Social History     Social History     Marital status:      Spouse name: N/A     Number of children: 3     Years of education: N/A     Occupational History     Not on file.     Social " "History Main Topics     Smoking status: Former Smoker     Smokeless tobacco: Never Used     Alcohol use 0.0 oz/week     0 Standard drinks or equivalent per week     Drug use: Not on file     Sexual activity: Not Currently     Partners: Male     Birth control/ protection: Post-menopausal     Other Topics Concern     Not on file     Social History Narrative     Fabio had stage 4 kidney cancer, metastatic to the brain,  2010.       Family History:   The patient's family history is significant for.  Family History   Problem Relation Age of Onset     Anxiety Disorder Daughter      Colon Cancer Paternal Grandfather 60     DIABETES Mother      Hypertension Mother      OSTEOPOROSIS Father      Endometrial Cancer Sister 50         Physical Exam:   /82  Pulse 58  Temp 98.4  F (36.9  C) (Tympanic)  Resp 16  Ht 1.613 m (5' 3.5\")  Wt 57.6 kg (127 lb)  SpO2 98%  BMI 22.14 kg/m2  Body mass index is 22.14 kg/(m^2).    General Appearance: healthy and alert, no distress     Assessment:    Kristi Aguero is a 63 year old woman with a new diagnosis of endometrial polyp in the setting of a sister with endometrial cancer.     A total of 30 minutes was spent with the patient, >50% of which were spent in counseling the patient and/or treatment planning.      Plan:     1.)    Endometrial polyp in the setting of a sister with endometrial cancer.  We discussed her endometrial polyp and that this was benign as most endometrial polyps are.  There is no further surveillance or screening that is necessary for this endometrial polyp but I did advise that if she were to ever develop PMB, even if it is just light or pink, that she should seek an appointment with her gynecologist for another EMB.  We discussed her sisters diagnosis of endometrial cancer was most likely sporadic but that with a paternal uncle also with colon cancer there is a small chance of there being Goldsmith syndrome in the family and thus I have referred her " to genetic counseling.  She does not need further follow up with me unless genetic counseling reveals a mutation or she develops PMB.    2.) Other HR HPV+, will need repeat pap at 1 year so in 2-3 weeks which she will have with her primary gynecologist     2.) Genetic risk factors were assessed and the patient does meet the qualifications for a referral and she will schedule.      3.) Labs and/or tests ordered include:  none.     4.) Health maintenance issues addressed today include pt is up to date.          Thank you for allowing us to participate in the care of your patient.         Sincerely,    Monica Maharaj MD  Gynecologic Oncology  [facility]       CC  MASTERS, PAM JOSE         Again, thank you for allowing me to participate in the care of your patient.        Sincerely,        Bear Maharaj MD

## 2018-06-01 ENCOUNTER — OFFICE VISIT (OUTPATIENT)
Dept: OBGYN | Facility: CLINIC | Age: 63
End: 2018-06-01
Payer: COMMERCIAL

## 2018-06-01 ENCOUNTER — RADIANT APPOINTMENT (OUTPATIENT)
Dept: MAMMOGRAPHY | Facility: CLINIC | Age: 63
End: 2018-06-01
Attending: OBSTETRICS & GYNECOLOGY
Payer: COMMERCIAL

## 2018-06-01 VITALS
WEIGHT: 128 LBS | DIASTOLIC BLOOD PRESSURE: 68 MMHG | BODY MASS INDEX: 22.68 KG/M2 | HEART RATE: 70 BPM | HEIGHT: 63 IN | SYSTOLIC BLOOD PRESSURE: 118 MMHG

## 2018-06-01 DIAGNOSIS — Z12.31 VISIT FOR SCREENING MAMMOGRAM: ICD-10-CM

## 2018-06-01 DIAGNOSIS — R87.810 CERVICAL HIGH RISK HPV (HUMAN PAPILLOMAVIRUS) TEST POSITIVE: ICD-10-CM

## 2018-06-01 DIAGNOSIS — Z01.419 ENCOUNTER FOR GYNECOLOGICAL EXAMINATION WITHOUT ABNORMAL FINDING: Primary | ICD-10-CM

## 2018-06-01 PROCEDURE — 87624 HPV HI-RISK TYP POOLED RSLT: CPT | Performed by: OBSTETRICS & GYNECOLOGY

## 2018-06-01 PROCEDURE — 77063 BREAST TOMOSYNTHESIS BI: CPT | Mod: TC

## 2018-06-01 PROCEDURE — 99396 PREV VISIT EST AGE 40-64: CPT | Performed by: OBSTETRICS & GYNECOLOGY

## 2018-06-01 PROCEDURE — 77067 SCR MAMMO BI INCL CAD: CPT | Mod: TC

## 2018-06-01 PROCEDURE — G0123 SCREEN CERV/VAG THIN LAYER: HCPCS | Performed by: OBSTETRICS & GYNECOLOGY

## 2018-06-01 ASSESSMENT — ANXIETY QUESTIONNAIRES
IF YOU CHECKED OFF ANY PROBLEMS ON THIS QUESTIONNAIRE, HOW DIFFICULT HAVE THESE PROBLEMS MADE IT FOR YOU TO DO YOUR WORK, TAKE CARE OF THINGS AT HOME, OR GET ALONG WITH OTHER PEOPLE: NOT DIFFICULT AT ALL
6. BECOMING EASILY ANNOYED OR IRRITABLE: NOT AT ALL
3. WORRYING TOO MUCH ABOUT DIFFERENT THINGS: NOT AT ALL
1. FEELING NERVOUS, ANXIOUS, OR ON EDGE: NOT AT ALL
GAD7 TOTAL SCORE: 0
7. FEELING AFRAID AS IF SOMETHING AWFUL MIGHT HAPPEN: NOT AT ALL
5. BEING SO RESTLESS THAT IT IS HARD TO SIT STILL: NOT AT ALL
2. NOT BEING ABLE TO STOP OR CONTROL WORRYING: NOT AT ALL

## 2018-06-01 ASSESSMENT — PATIENT HEALTH QUESTIONNAIRE - PHQ9: 5. POOR APPETITE OR OVEREATING: NOT AT ALL

## 2018-06-01 NOTE — PROGRESS NOTES
Kristi is a 63 year old  female who presents for annual exam.     Besides routine health maintenance, she has no other health concerns today .    HPI:  The patient's PCP is  Saji An MD.     Exercise 5x/wk. Vit D, vit B12.     Had f/u with Dr. Maharaj, see note. No further tx needed.   No bleeding/discharge.    Mammo today.     Recently had EGD wnl. Going to start omeprazole per IM. Is also following a spot on the liver found on CT. Next CT in 3mo.       GYNECOLOGIC HISTORY:    No LMP recorded. Patient is postmenopausal.  Her current contraception method is: menopause.  She  reports that she has quit smoking. She has never used smokeless tobacco.    Patient is sexually active.  STD testing offered?  Declined  Last PHQ-9 score on record =   PHQ-9 SCORE 2018   Total Score 0     Last GAD7 score on record =   ALEC-7 SCORE 2018   Total Score 0     Alcohol Score =1 or 2    HEALTH MAINTENANCE:  Cholesterol:5/3/18   Total= 267, Triglycerides=52, HDL=93, OKI=990    Cholesterol   Date Value Ref Range Status   10/26/2011 221 (A) 115 - 199 mg/dL Final      Last Mammo: one year ago, Result: normal, Next Mammo: today.  Pap: 17 NIL HPV plosive   Lab Results   Component Value Date    PAP NIL 2017      Colonoscopy:  2017, Result: normal, Next Colonoscopy: 10years.  Dexa:  Na     Health maintenance updated:  yes    HISTORY:  Obstetric History       T2      L3     SAB1   TAB0   Ectopic0   Multiple0   Live Births3       # Outcome Date GA Lbr Alexx/2nd Weight Sex Delivery Anes PTL Lv   4   33w0d  4 lb 6 oz (1.984 kg) F    KAMILLE      Name: Verito   3 Term  39w0d  6 lb 3 oz (2.807 kg) M    KAMILLE      Name: Alden   2 Term  37w0d  5 lb 10 oz (2.551 kg) M    KAMILLE      Name: Surya   1 SAB                   Patient Active Problem List   Diagnosis     Essential hypertension     Cerebral infarction (H)     Hypotension, postural     Cervical high risk HPV (human  "papillomavirus) test positive     Past Surgical History:   Procedure Laterality Date     NO HISTORY OF SURGERY        Social History   Substance Use Topics     Smoking status: Former Smoker     Smokeless tobacco: Never Used     Alcohol use 0.0 oz/week     0 Standard drinks or equivalent per week      Problem (# of Occurrences) Relation (Name,Age of Onset)    Anxiety Disorder (1) Daughter    Colon Cancer (1) Paternal Grandfather (60)    DIABETES (1) Mother    Endometrial Cancer (1) Sister (50)    Esophageal Cancer (1) Mother    Hypertension (1) Mother    OSTEOPOROSIS (1) Father            Current Outpatient Prescriptions   Medication Sig     ASPIRIN PO Take  by mouth.       atenolol (TENORMIN) 50 MG tablet Take 0.5 tablets by mouth daily.     Cyanocobalamin (VITAMIN B-12 PO)      omeprazole (PRILOSEC) 20 MG CR capsule Take 20 mg by mouth     quinapril (ACCUPRIL) 40 MG tablet TAKE ONE TABLET BY MOUTH EVERY DAY     VITAMIN D, CHOLECALCIFEROL, PO Take by mouth daily     No current facility-administered medications for this visit.      No Known Allergies    Past medical, surgical, social and family histories were reviewed and updated in [facility].    ROS:       EXAM:  /68  Pulse 70  Ht 5' 3\" (1.6 m)  Wt 128 lb (58.1 kg)  BMI 22.67 kg/m2   BMI: Body mass index is 22.67 kg/(m^2).    PHYSICAL EXAM:  Constitutional:  Appearance: Well nourished, well developed, alert, in no acute distress  Neck:  Lymph Nodes:  No lymphadenopathy present    Thyroid:  Gland size normal, nontender, no nodules or masses present  on palpation  Chest:  Respiratory Effort:  Breathing unlabored  Cardiovascular:    Heart: Auscultation:  Regular rate, normal rhythm, no murmurs present  Breasts: Inspection of Breasts:  No lymphadenopathy present., Palpation of Breasts and Axillae:  No masses present on palpation, no breast tenderness., Axillary Lymph Nodes:  No lymphadenopathy present. and No nodularity, asymmetry or nipple discharge " bilaterally.  Gastrointestinal:   Abdominal Examination:  Abdomen nontender to palpation, tone normal without rigidity or guarding, no masses present, umbilicus without lesions   Liver and Spleen:  No hepatomegaly present, liver nontender to palpation    Hernias:  No hernias present  Lymphatic: Lymph Nodes:  No other lymphadenopathy present  Skin:  General Inspection:  No rashes present, no lesions present, no areas of  discoloration    Genitalia and Groin:  No rashes present, no lesions present, no areas of  discoloration, no masses present  Neurologic/Psychiatric:    Mental Status:  Oriented X3     Pelvic Exam:  External Genitalia:     Normal appearance for age, no discharge present, no tenderness present, no inflammatory lesions present, color normal  Vagina:     Normal vaginal vault without central or paravaginal defects, no discharge present, no inflammatory lesions present, no masses present  Bladder:     Nontender to palpation  Urethra:   Urethral Body:  Urethra palpation normal, urethra structural support normal   Urethral Meatus:  No erythema or lesions present  Cervix:     Appearance healthy, no lesions present, nontender to palpation, no bleeding present  Uterus:     Uterus: firm, normal sized and nontender, midplane in position.   Adnexa:     No adnexal tenderness present, no adnexal masses present  Perineum:     Perineum within normal limits, no evidence of trauma, no rashes or skin lesions present  Anus:     Anus within normal limits, no hemorrhoids present  Inguinal Lymph Nodes:     No lymphadenopathy present  Pubic Hair:     Normal pubic hair distribution for age  Genitalia and Groin:     No rashes present, no lesions present, no areas of discoloration, no masses present      COUNSELING:   Reviewed preventive health counseling, as reflected in patient instructions    BMI: Body mass index is 22.67 kg/(m^2).      ASSESSMENT:  63 year old female with satisfactory annual exam.    ICD-10-CM    1.  Encounter for gynecological examination without abnormal finding Z01.419 Pap imaged thin layer screen with HPV - recommended age 30 - 65     HPV High Risk Types DNA Cervical   2. Cervical high risk HPV (human papillomavirus) test positive R87.810 Pap imaged thin layer screen with HPV - recommended age 30 - 65     HPV High Risk Types DNA Cervical       PLAN:  -Pap/hpv for cervical cancer screening.  Hx HPV oth HR pos last year with normal pap.  -Breast self awareness discussed. Due for mammogram.  -Discussed exercise-making plan, strength training. Nutrition encouraged.  -Colonoscopy UTD  -Osteoporosis prevention discussed.  -PCP manages labs  -PMB precautions.   -Return one year for next annual exam      Morelia Teran Masters, DO

## 2018-06-01 NOTE — MR AVS SNAPSHOT
"              After Visit Summary   6/1/2018    Kristi Aguero    MRN: [id number]           Patient Information     Date Of Birth          1955        Visit Information        Provider Department      6/1/2018 9:00 AM Morelia Crabtree DO [facility] [place]        Today's Diagnoses     Encounter for gynecological examination without abnormal finding    -  1    Cervical high risk HPV (human papillomavirus) test positive           Follow-ups after your visit        Follow-up notes from your care team     Return in about 1 year (around 6/1/2019).      Your next 10 appointments already scheduled     Jun 01, 2018 10:15 AM CDT   MA SCREENING BILATERAL W/ FARHAN with WEMA1   [facility] [place] ([facility] [place])    [address]  [facility] [address] [phone]           Three-dimensional (3D) mammograms are available at [place] locations in [facility], [place], [place], [facility], [place], [place], and [place]. Health locations include [place] and [facility] in [place]. Benefits of 3D mammograms include: - Improved rate of cancer detection - Decreases your chance of having to go back for more tests, which means fewer: - \"False-positive\" results (This means that there is an abnormal area but it isn't cancer.) - Invasive testing procedures, such as a biopsy or surgery - Can provide clearer images of the breast if you have dense breast tissue. 3D mammography is an optional exam that anyone can have with a 2D mammogram. It doesn't replace or take the place of a 2D mammogram. 2D mammograms remain an effective screening test for all women.  Not all insurance companies cover the cost of a 3D mammogram. Check with your insurance.            Jul 31, 2018  9:00 AM CDT   New Visit with Lucrecia Caro GC   Cancer Risk Management Program ([facility])    [facility]  [address] [place] [address]  [facility] [id number] [phone]              Who to contact     If you have questions or need follow up information about today's clinic visit or your schedule please contact [facility] [place] directly at [phone].  Normal or non-critical lab and imaging results will be communicated to you by MyChart, letter or phone within 4 business days after the clinic has received the results. If you do not hear from us within 7 days, please contact the clinic through MyChart or phone. If you have a critical or abnormal lab result, we will notify you by phone as soon as possible.  Submit refill requests through [employer] or call your pharmacy and they will forward the refill request to us. Please allow 3 business days for your refill to be completed.          Additional Information About Your Visit        HiChinahart Information     [employer] gives you secure access to your electronic health record. If you see a primary care provider, you can also send messages to your care team and make appointments. If you have questions, please call your primary care clinic.  If you do not have a primary care provider, please call [phone] and they will assist you.        Care EveryWhere ID     This is your Care EveryWhere ID. This could be used by other organizations to access your [place] medical records  [id number]        Your Vitals Were     Pulse Height BMI (Body Mass Index)             70 5' 3\" (1.6 m) 22.67 kg/m2          Blood Pressure from Last 3 Encounters:   06/01/18 118/68   05/08/18 136/82   03/05/18 142/86    Weight from Last 3 Encounters:   06/01/18 128 lb (58.1 kg)   05/08/18 127 lb (57.6 kg)   03/05/18 130 lb 3.2 oz (59.1 kg)              We Performed the Following     HPV High Risk Types DNA Cervical     Pap imaged thin layer screen with HPV - recommended age 30 - 65        Primary Care Provider Office Phone # Fax #    Saji An [phone]       [facility] " [address] [address]        Equal Access to Services     NICHOLAS PANDEY : Hadii aad ku hadquinnluis Naomyerik, walorrainehamlet combsrajaniha, donnashirley wardcollettehamlet medrano, sanket germainmirandacharlette cedeno. So [facility] [phone].    ATENCIÓN: Si habla español, tiene a gaspar disposición servicios gratuitos de asistencia lingüística. [facility] [phone].    We comply with applicable federal civil rights laws and [place] laws. We do not discriminate on the basis of race, color, national origin, age, disability, sex, sexual orientation, or gender identity.            Thank you!     Thank you for choosing [facility]  for your care. Our goal is always to provide you with excellent care. Hearing back from our patients is one way we can continue to improve our services. Please take a few minutes to complete the written survey that you may receive in the mail after your visit with us. Thank you!             Your Updated Medication List - Protect others around you: Learn how to safely use, store and throw away your medicines at www.disposemymeds.org.          This list is accurate as of 6/1/18  9:35 AM.  Always use your most recent med list.                   Brand Name Dispense Instructions for use Diagnosis    ASPIRIN PO      Take  by mouth.        atenolol 50 MG tablet    TENORMIN    90 tablet    Take 0.5 tablets by mouth daily.    Unspecified essential hypertension       omeprazole 20 MG CR capsule    priLOSEC     Take 20 mg by mouth        quinapril 40 MG Tab    ACCUPRIL    90 tablet    TAKE ONE TABLET BY MOUTH EVERY DAY    Muscle cramps       VITAMIN B-12 PO           VITAMIN D (CHOLECALCIFEROL) PO      Take by mouth daily

## 2018-06-02 ASSESSMENT — PATIENT HEALTH QUESTIONNAIRE - PHQ9: SUM OF ALL RESPONSES TO PHQ QUESTIONS 1-9: 0

## 2018-06-02 ASSESSMENT — ANXIETY QUESTIONNAIRES: GAD7 TOTAL SCORE: 0

## 2018-06-06 LAB
COPATH REPORT: NORMAL
PAP: NORMAL

## 2018-06-07 LAB
FINAL DIAGNOSIS: ABNORMAL
HPV HR 12 DNA CVX QL NAA+PROBE: POSITIVE
HPV16 DNA SPEC QL NAA+PROBE: NEGATIVE
HPV18 DNA SPEC QL NAA+PROBE: NEGATIVE
SPECIMEN DESCRIPTION: ABNORMAL
SPECIMEN SOURCE CVX/VAG CYTO: ABNORMAL

## 2018-06-15 ENCOUNTER — OFFICE VISIT (OUTPATIENT)
Dept: OBGYN | Facility: CLINIC | Age: 63
End: 2018-06-15
Payer: COMMERCIAL

## 2018-06-15 VITALS
SYSTOLIC BLOOD PRESSURE: 132 MMHG | BODY MASS INDEX: 22.5 KG/M2 | WEIGHT: 127 LBS | DIASTOLIC BLOOD PRESSURE: 80 MMHG | HEIGHT: 63 IN

## 2018-06-15 DIAGNOSIS — R87.810 CERVICAL HIGH RISK HPV (HUMAN PAPILLOMAVIRUS) TEST POSITIVE: Primary | ICD-10-CM

## 2018-06-15 PROCEDURE — 57456 ENDOCERV CURETTAGE W/SCOPE: CPT | Performed by: OBSTETRICS & GYNECOLOGY

## 2018-06-15 PROCEDURE — 99212 OFFICE O/P EST SF 10 MIN: CPT | Mod: 25 | Performed by: OBSTETRICS & GYNECOLOGY

## 2018-06-15 PROCEDURE — 88305 TISSUE EXAM BY PATHOLOGIST: CPT | Performed by: OBSTETRICS & GYNECOLOGY

## 2018-06-15 NOTE — PROGRESS NOTES
INDICATIONS:                                                      Kristi Aguero, is a 63 year old female, who had a recent negative pap, positive other HR-HPV.  Patient has prior history of abnormal pap. Here today for colposcopy. Discussed indication, risks of infection and bleeding.    Her last pap was   Lab Results   Component Value Date    PAP NIL, Positive Other HR-HPV 06/01/2018     Is a pregnancy test required: No.  Was a consent obtained?  Yes    Pap history: 2008 WNL, 2009 wnl, 2011 wnl, 2012 wnl/neg;  5/17 Nil/Oth HR HPV +, 6/18 NIL/Other HR HPV+      PROCEDURE:                                                      Cervix is stained with acetic acid and viewed colposcopically. Squamocolumnar junction is visualized in it's entirity. no visible lesions . Biopsy done No. Endocervical curretage Done       POST PROCEDURE:                                                      IMPRESSION: Patient tolerated procedure well, colposcopy adequate and Normal cervix    PLAN : Await the results of the biopsies.  She  tolerated the procedure well. There were no complications. Patient was discharged in stable condition.    Patient advised to call the clinic if excessive bleeding, pelvic pain, or fever.     Follow-up plan based on pathology results.    Discussed HPV-natural history, transmission, precancerous and cancerous changes, prevention and treatment, cervical cancer screening guidelines, carrier states/recurrence and her individual test history. Additionally discussed ways to ensure healthy immune system for best chances of fighting off virus such as sleep, exercise, nutrition, and avoidance of tobacco products. Reviewed specifics of procedure, aftercare and notification of results. Questions answered.     (10 minutes was spent face to face with the patient today discussing her history, diagnosis, and follow-up plan as noted above in addition to procedure. Over 50% of the visit was spent in counseling and coordination  of care.)      Morelia Teran Masters, DO

## 2018-06-15 NOTE — MR AVS SNAPSHOT
After Visit Summary   6/15/2018    Kristi Aguero    MRN: [id number]           Patient Information     Date Of Birth          1955        Visit Information        Provider Department      6/15/2018 10:50 AM Morelia Crabtree, ; [facility] 1 [facility] [place]        Today's Diagnoses     Cervical high risk HPV (human papillomavirus) test positive    -  1       Follow-ups after your visit        Your next 10 appointments already scheduled     Jul 31, 2018  9:00 AM CDT   New Visit with Lucrecia Caro GC   Cancer Risk Management Program ([facility])    [facility]  [address]  [facility] [address] [phone]              Who to contact     If you have questions or need follow up information about today's clinic visit or your schedule please contact [facility] [place] directly at [phone].  Normal or non-critical lab and imaging results will be communicated to you by MyChart, letter or phone within 4 business days after the clinic has received the results. If you do not hear from us within 7 days, please contact the clinic through [employer] or phone. If you have a critical or abnormal lab result, we will notify you by phone as soon as possible.  Submit refill requests through [employer] or call your pharmacy and they will forward the refill request to us. Please allow 3 business days for your refill to be completed.          Additional Information About Your Visit        MyChart Information     [employer] gives you secure access to your electronic health record. If you see a primary care provider, you can also send messages to your care team and make appointments. If you have questions, please call your primary care clinic.  If you do not have a primary care provider, please call [phone] and they will assist you.        Care EveryWhere ID     This is your Care EveryWhere ID. This could be used by other organizations  "to access your [place] medical records  [id number]        Your Vitals Were     Height BMI (Body Mass Index)                5' 3\" (1.6 m) 22.5 kg/m2           Blood Pressure from Last 3 Encounters:   06/15/18 132/80   06/01/18 118/68   05/08/18 136/82    Weight from Last 3 Encounters:   06/15/18 127 lb (57.6 kg)   06/01/18 128 lb (58.1 kg)   05/08/18 127 lb (57.6 kg)              We Performed the Following     COLPOSCOPY CERVIX/UPPER VAGINA W BX CERVIX     Surgical pathology exam        Primary Care Provider Office Phone # Fax #    Saji An [phone]       [place] [address]        Equal Access to Services     [facility] : Hadii junior edwards hadasho Soerik, waaxda luqadaha, qaybta kaalmada adeegyada, sanket rosa . So [facility] [phone].    ATENCIÓN: Si habla español, tiene a gaspar disposición servicios gratuitos de asistencia lingüística. Lloyd al [phone].    We comply with applicable federal civil rights laws and [place] laws. We do not discriminate on the basis of race, color, national origin, age, disability, sex, sexual orientation, or gender identity.            Thank you!     Thank you for choosing [facility]  for your care. Our goal is always to provide you with excellent care. Hearing back from our patients is one way we can continue to improve our services. Please take a few minutes to complete the written survey that you may receive in the mail after your visit with us. Thank you!             Your Updated Medication List - Protect others around you: Learn how to safely use, store and throw away your medicines at www.disposemymeds.org.          This list is accurate as of 6/15/18  1:06 PM.  Always use your most recent med list.                   Brand Name Dispense Instructions for use Diagnosis    ASPIRIN PO      Take  by mouth.        atenolol 50 MG tablet    TENORMIN    90 tablet "    Take 0.5 tablets by mouth daily.    Unspecified essential hypertension       omeprazole 20 MG CR capsule    priLOSEC     Take 20 mg by mouth        quinapril 40 MG Tab    ACCUPRIL    90 tablet    TAKE ONE TABLET BY MOUTH EVERY DAY    Muscle cramps       VITAMIN B-12 PO           VITAMIN D (CHOLECALCIFEROL) PO      Take by mouth daily

## 2018-06-19 LAB — COPATH REPORT: NORMAL

## 2018-12-07 ENCOUNTER — OFFICE VISIT (OUTPATIENT)
Dept: OBGYN | Facility: CLINIC | Age: 63
End: 2018-12-07
Payer: COMMERCIAL

## 2018-12-07 VITALS — WEIGHT: 129.2 LBS | DIASTOLIC BLOOD PRESSURE: 82 MMHG | SYSTOLIC BLOOD PRESSURE: 130 MMHG | BODY MASS INDEX: 22.89 KG/M2

## 2018-12-07 DIAGNOSIS — M25.551 PAIN IN JOINT INVOLVING RIGHT PELVIC REGION AND THIGH: Primary | ICD-10-CM

## 2018-12-07 DIAGNOSIS — R30.0 DYSURIA: ICD-10-CM

## 2018-12-07 DIAGNOSIS — Z23 NEED FOR TDAP VACCINATION: ICD-10-CM

## 2018-12-07 LAB
ALBUMIN UR-MCNC: NEGATIVE MG/DL
APPEARANCE UR: CLEAR
BACTERIA #/AREA URNS HPF: ABNORMAL /HPF
BILIRUB UR QL STRIP: NEGATIVE
COLOR UR AUTO: YELLOW
GLUCOSE UR STRIP-MCNC: NEGATIVE MG/DL
HGB UR QL STRIP: NEGATIVE
KETONES UR STRIP-MCNC: NEGATIVE MG/DL
LEUKOCYTE ESTERASE UR QL STRIP: NEGATIVE
NITRATE UR QL: NEGATIVE
NON-SQ EPI CELLS #/AREA URNS LPF: ABNORMAL /LPF
PH UR STRIP: 5.5 PH (ref 5–7)
RBC #/AREA URNS AUTO: ABNORMAL /HPF
SOURCE: ABNORMAL
SP GR UR STRIP: 1.02 (ref 1–1.03)
UROBILINOGEN UR STRIP-ACNC: 0.2 EU/DL (ref 0.2–1)
WBC #/AREA URNS AUTO: ABNORMAL /HPF

## 2018-12-07 PROCEDURE — 90715 TDAP VACCINE 7 YRS/> IM: CPT | Performed by: OBSTETRICS & GYNECOLOGY

## 2018-12-07 PROCEDURE — 90471 IMMUNIZATION ADMIN: CPT | Performed by: OBSTETRICS & GYNECOLOGY

## 2018-12-07 PROCEDURE — 81001 URINALYSIS AUTO W/SCOPE: CPT | Performed by: OBSTETRICS & GYNECOLOGY

## 2018-12-07 PROCEDURE — 99213 OFFICE O/P EST LOW 20 MIN: CPT | Mod: 25 | Performed by: OBSTETRICS & GYNECOLOGY

## 2018-12-07 NOTE — PROGRESS NOTES
SUBJECTIVE:                                                   Kristi Aguero is a 63 year old female who presents to clinic today for the following health issue(s):  Patient presents with:  Vaginal Problem: Patient states that she has been having some vaginal discomfort for about 2/3 weeks now more so surrounding her vaginal opening.       HPI:  C/o 2-3 weeks of right sided external burning pain, but sometimes happens on left too. Feels like has a sore but doesn't. Rare itching. Nothing in particular triggers, is random.   Has stopped wearing tight clothes, using sensitive skin products.   No vaginal discharge or bleeding.   No urinary changes.     Has cut back a lot on exercise recently. Does mostly pickle ball and yoga.     Review of PMH, SocHx, SurHx, FHx, medications completed. Epic updated.      No LMP recorded. Patient is postmenopausal..   Patient is not sexually active, .  Using menopause for contraception.    reports that she has quit smoking. She has never used smokeless tobacco.    STD testing offered?  Declined    Health maintenance updated:  yes    Today's PHQ-2 Score:   PHQ-2 (  [employer]) 2/15/2018   Q1: Little interest or pleasure in doing things 0   Q2: Feeling down, depressed or hopeless 0   PHQ-2 Score 0     Today's PHQ-9 Score:   PHQ-9 SCORE 2018   PHQ-9 Total Score 0     Today's ALEC-7 Score:   ALEC-7 SCORE 2018   Total Score 0       Problem list and histories reviewed & adjusted, as indicated.  Additional history: as documented.    Patient Active Problem List   Diagnosis     Essential hypertension     Cerebral infarction (H)     Hypotension, postural     Cervical high risk HPV (human papillomavirus) test positive     Past Surgical History:   Procedure Laterality Date     NO HISTORY OF SURGERY        Social History   Substance Use Topics     Smoking status: Former Smoker     Smokeless tobacco: Never Used      Comment: Quit 40+ years ago     Alcohol use 0.0 oz/week     0 Standard  drinks or equivalent per week      Problem (# of Occurrences) Relation (Name,Age of Onset)    Anxiety Disorder (1) Daughter    Colon Cancer (1) Paternal Grandfather (60)    Diabetes (1) Mother    Endometrial Cancer (1) Sister (50)    Esophageal Cancer (1) Mother    Hypertension (1) Mother    Osteoporosis (1) Father            Current Outpatient Prescriptions   Medication Sig     ASPIRIN PO Take  by mouth.       atenolol (TENORMIN) 50 MG tablet Take 0.5 tablets by mouth daily.     Cyanocobalamin (VITAMIN B-12 PO)      omeprazole (PRILOSEC) 20 MG CR capsule Take 20 mg by mouth     quinapril (ACCUPRIL) 40 MG tablet TAKE ONE TABLET BY MOUTH EVERY DAY     VITAMIN D, CHOLECALCIFEROL, PO Take by mouth daily     No current facility-administered medications for this visit.      No Known Allergies    ROS:  12 point review of systems negative other than symptoms noted below.  Genitourinary: Pelvic Pain    OBJECTIVE:     /82  Wt 129 lb 3.2 oz (58.6 kg)  Breastfeeding? No  BMI 22.89 kg/m2  Body mass index is 22.89 kg/(m^2).    Exam:  Constitutional:  Appearance: Well nourished, well developed alert, in no acute distress  Chest:  Respiratory Effort:  Breathing unlabored  Lymphatic: Lymph Nodes:  No other lymphadenopathy present  Skin:General Inspection:  No rashes present, no lesions present, no areas of discoloration; Genitalia and Groin:  No rashes present, no lesions present, no areas of discoloration, no masses present.  Neurologic/Psychiatric:  Mental Status:  Oriented X3   Pelvic Exam:  External Genitalia:     Normal appearance for age, no discharge present, no tenderness present, no inflammatory lesions present, color normal  Vagina:     Normal vaginal vault without central or paravaginal defects, no discharge present, no inflammatory lesions present, no masses present, b/l vaginal wall ttp and hypertonicity of left sidewall musculature  Bladder:     Nontender to palpation  Urethra:   Urethral Body:  Urethra  palpation normal, urethra structural support normal   Urethral Meatus:  No erythema or lesions present  Cervix:     Appearance healthy, no lesions present, nontender to palpation, no bleeding present  Uterus:     Uterus: firm, normal sized and nontender, retroverted in position.   Adnexa:     No adnexal tenderness present, no adnexal masses present  Perineum:     Perineum within normal limits, no evidence of trauma, no rashes or skin lesions present  Anus:     Anus within normal limits, no hemorrhoids present  Inguinal Lymph Nodes:     No lymphadenopathy present  Pubic Hair:     Normal pubic hair distribution for age  Genitalia and Groin:     No rashes present, no lesions present, no areas of discoloration, no masses present       In-Clinic Test Results:  Results for orders placed or performed in visit on 12/07/18 (from the past 24 hour(s))   UA with Microscopic   Result Value Ref Range    Color Urine Yellow     Appearance Urine Clear     Glucose Urine Negative NEG^Negative mg/dL    Bilirubin Urine Negative NEG^Negative    Ketones Urine Negative NEG^Negative mg/dL    Specific Gravity Urine 1.025 1.003 - 1.035    pH Urine 5.5 5.0 - 7.0 pH    Protein Albumin Urine Negative NEG^Negative mg/dL    Urobilinogen Urine 0.2 0.2 - 1.0 EU/dL    Nitrite Urine Negative NEG^Negative    Blood Urine Negative NEG^Negative    Leukocyte Esterase Urine Negative NEG^Negative    Source Midstream Urine     WBC Urine 0 - 5 OTO5^0 - 5 /HPF    RBC Urine O - 2 OTO2^O - 2 /HPF    Squamous Epithelial /LPF Urine Few FEW^Few /LPF    Bacteria Urine Few (A) NEG^Negative /HPF       ASSESSMENT/PLAN:                                                        ICD-10-CM    1. Pain in joint involving right pelvic region and thigh M25.551    2. Dysuria R30.0 UA with Microscopic   3. Need for Tdap vaccination Z23 TDAP, IM (10 - 64 YRS) - Adacel     VACCINE ADMINISTRATION, INITIAL       -New onset right>left burning of crural fold. Normal exam with exception of  b/l tenderness of vaginal sidewalls on bimanual. Discussed core and glute strengthening in addition to her stretching exercises. If continues in next month, may call back or mychart and will place referral for pelvic PT. Questions answered. Pt happy with plan.  -Tdap vacc today.    Morelia eTran Masters, [facility]

## 2018-12-07 NOTE — MR AVS SNAPSHOT
After Visit Summary   12/7/2018    Kristi Aguero    MRN: [id number]           Patient Information     Date Of Birth          1955        Visit Information        Provider Department      12/7/2018 8:40 AM Morelia Crabtree,  [facility] [place]        Today's Diagnoses     Pain in joint involving right pelvic region and thigh    -  1    Dysuria        Need for Tdap vaccination           Follow-ups after your visit        Follow-up notes from your care team     Return if symptoms worsen or fail to improve.      Who to contact     If you have questions or need follow up information about today's clinic visit or your schedule please contact [facility] [place] directly at [phone].  Normal or non-critical lab and imaging results will be communicated to you by MyChart, letter or phone within 4 business days after the clinic has received the results. If you do not hear from us within 7 days, please contact the clinic through [employer] or phone. If you have a critical or abnormal lab result, we will notify you by phone as soon as possible.  Submit refill requests through [employer] or call your pharmacy and they will forward the refill request to us. Please allow 3 business days for your refill to be completed.          Additional Information About Your Visit        MyChart Information     [employer] gives you secure access to your electronic health record. If you see a primary care provider, you can also send messages to your care team and make appointments. If you have questions, please call your primary care clinic.  If you do not have a primary care provider, please call [phone] and they will assist you.        Care EveryWhere ID     This is your Care EveryWhere ID. This could be used by other organizations to access your [place] medical records  [id number]        Your Vitals Were     Breastfeeding? BMI (Body Mass Index)                No 22.89 kg/m2           Blood  Pressure from Last 3 Encounters:   12/07/18 130/82   06/15/18 132/80   06/01/18 118/68    Weight from Last 3 Encounters:   12/07/18 129 lb 3.2 oz (58.6 kg)   06/15/18 127 lb (57.6 kg)   06/01/18 128 lb (58.1 kg)              We Performed the Following     TDAP, IM (10 - 64 YRS) - Adacel     UA with Microscopic     VACCINE ADMINISTRATION, INITIAL        Primary Care Provider    Saji An MD       No address on file        Equal Access to Services     [facility]: Hadii aad ku hadasho Soomaali, waaxda luqadaha, qaybta kaalmada adeegyada, waxay maryin hayashley rosa . So [facility] [phone].    ATENCIÓN: Si habla español, tiene a gaspar disposición servicios gratuitos de asistencia lingüística. Llame al [phone].    We comply with applicable federal civil rights laws and [place] laws. We do not discriminate on the basis of race, color, national origin, age, disability, sex, sexual orientation, or gender identity.            Thank you!     Thank you for choosing [facility]  for your care. Our goal is always to provide you with excellent care. Hearing back from our patients is one way we can continue to improve our services. Please take a few minutes to complete the written survey that you may receive in the mail after your visit with us. Thank you!             Your Updated Medication List - Protect others around you: Learn how to safely use, store and throw away your medicines at www.disposemymeds.org.          This list is accurate as of 12/7/18  9:15 AM.  Always use your most recent med list.                   Brand Name Dispense Instructions for use Diagnosis    ASPIRIN PO      Take  by mouth.        atenolol 50 MG tablet    TENORMIN    90 tablet    Take 0.5 tablets by mouth daily.    Unspecified essential hypertension       omeprazole 20 MG DR capsule    priLOSEC     Take 20 mg by mouth        quinapril 40 MG tablet    ACCUPRIL    90 tablet    TAKE ONE TABLET BY MOUTH EVERY  DAY    Muscle cramps       VITAMIN B-12 PO           VITAMIN D (CHOLECALCIFEROL) PO      Take by mouth daily

## 2019-06-03 ENCOUNTER — ANCILLARY PROCEDURE (OUTPATIENT)
Dept: MAMMOGRAPHY | Facility: CLINIC | Age: 64
End: 2019-06-03
Payer: COMMERCIAL

## 2019-06-03 ENCOUNTER — OFFICE VISIT (OUTPATIENT)
Dept: OBGYN | Facility: CLINIC | Age: 64
End: 2019-06-03
Payer: COMMERCIAL

## 2019-06-03 VITALS
SYSTOLIC BLOOD PRESSURE: 134 MMHG | DIASTOLIC BLOOD PRESSURE: 72 MMHG | WEIGHT: 129 LBS | HEIGHT: 64 IN | BODY MASS INDEX: 22.02 KG/M2

## 2019-06-03 DIAGNOSIS — Z01.419 ENCOUNTER FOR GYNECOLOGICAL EXAMINATION WITHOUT ABNORMAL FINDING: Primary | ICD-10-CM

## 2019-06-03 DIAGNOSIS — Z12.31 VISIT FOR SCREENING MAMMOGRAM: ICD-10-CM

## 2019-06-03 DIAGNOSIS — R87.810 CERVICAL HIGH RISK HPV (HUMAN PAPILLOMAVIRUS) TEST POSITIVE: ICD-10-CM

## 2019-06-03 PROCEDURE — 99396 PREV VISIT EST AGE 40-64: CPT | Performed by: OBSTETRICS & GYNECOLOGY

## 2019-06-03 PROCEDURE — 77067 SCR MAMMO BI INCL CAD: CPT | Mod: TC

## 2019-06-03 PROCEDURE — 88175 CYTOPATH C/V AUTO FLUID REDO: CPT | Performed by: OBSTETRICS & GYNECOLOGY

## 2019-06-03 PROCEDURE — 77063 BREAST TOMOSYNTHESIS BI: CPT | Mod: TC

## 2019-06-03 PROCEDURE — 87624 HPV HI-RISK TYP POOLED RSLT: CPT | Performed by: OBSTETRICS & GYNECOLOGY

## 2019-06-03 ASSESSMENT — ANXIETY QUESTIONNAIRES
5. BEING SO RESTLESS THAT IT IS HARD TO SIT STILL: NOT AT ALL
7. FEELING AFRAID AS IF SOMETHING AWFUL MIGHT HAPPEN: NOT AT ALL
IF YOU CHECKED OFF ANY PROBLEMS ON THIS QUESTIONNAIRE, HOW DIFFICULT HAVE THESE PROBLEMS MADE IT FOR YOU TO DO YOUR WORK, TAKE CARE OF THINGS AT HOME, OR GET ALONG WITH OTHER PEOPLE: NOT DIFFICULT AT ALL
6. BECOMING EASILY ANNOYED OR IRRITABLE: NOT AT ALL
2. NOT BEING ABLE TO STOP OR CONTROL WORRYING: NOT AT ALL
3. WORRYING TOO MUCH ABOUT DIFFERENT THINGS: NOT AT ALL
1. FEELING NERVOUS, ANXIOUS, OR ON EDGE: NOT AT ALL
GAD7 TOTAL SCORE: 0

## 2019-06-03 ASSESSMENT — MIFFLIN-ST. JEOR: SCORE: 1122.2

## 2019-06-03 ASSESSMENT — PATIENT HEALTH QUESTIONNAIRE - PHQ9
SUM OF ALL RESPONSES TO PHQ QUESTIONS 1-9: 0
5. POOR APPETITE OR OVEREATING: NOT AT ALL

## 2019-06-03 NOTE — PROGRESS NOTES
Kristi is a 64 year old  female who presents for annual exam.     Besides routine health maintenance, she has no other health concerns today .    HPI:    No issues.  Exercising regularly.    The patient's PCP is at [facility]. Manages labs.      GYNECOLOGIC HISTORY:    No LMP recorded. Patient is postmenopausal.  She  reports that she has quit smoking. She has never used smokeless tobacco.  Patient is sexually active.  STD testing offered?  Declined     Last PHQ-9 score on record =   PHQ-9 SCORE 6/3/2019   PHQ-9 Total Score 0     Last GAD7 score on record =   ALEC-7 SCORE 6/3/2019   Total Score 0     Alcohol Score = 4    HEALTH MAINTENANCE:  Cholesterol:5/3/18   Total= 267, Triglycerides=52, HDL=93, HHL=213  Last Mammo: 18, Result: normal, Next Mammo: today   Pap:   Lab Results   Component Value Date    PAP NIL, +HR-HPV 2018    PAP NIL 2017   Colonoscopy:  2017, Result: normal, Next Colonoscopy:   Dexa:  5/15/14    Health maintenance updated:  yes    HISTORY:  OB History    Para Term  AB Living   4 3 2 1 1 3   SAB TAB Ectopic Multiple Live Births   1 0 0 0 3      # Outcome Date GA Lbr Alexx/2nd Weight Sex Delivery Anes PTL Lv   4   33w0d  1.984 kg (4 lb 6 oz) F    KAMILLE      Name: Verito   3 Term  39w0d  2.807 kg (6 lb 3 oz) M    [place]      Name: Alden   2 Term  37w0d  2.551 kg (5 lb 10 oz) M    [place]      Name: Surya   1 SAB                Patient Active Problem List   Diagnosis     Essential hypertension     Cerebral infarction (H)     Hypotension, postural     Cervical high risk HPV (human papillomavirus) test positive     Past Surgical History:   Procedure Laterality Date     NO HISTORY OF SURGERY        Social History     Tobacco Use     Smoking status: Former Smoker     Smokeless tobacco: Never Used     Tobacco comment: Quit 40+ years ago   Substance Use Topics     Alcohol use: Yes     Alcohol/week: 0.0 oz      Problem (# of  "Occurrences) Relation (Name,Age of Onset)    Anxiety Disorder (1) Daughter    Colon Cancer (1) Paternal Grandfather (60)    Diabetes (1) Mother    Endometrial Cancer (1) Sister (50)    Esophageal Cancer (1) Mother    Hypertension (1) Mother    Osteoporosis (1) Father            Current Outpatient Medications   Medication Sig     ASPIRIN PO Take  by mouth.       atenolol (TENORMIN) 50 MG tablet Take 0.5 tablets by mouth daily.     Cyanocobalamin (VITAMIN B-12 PO)      METOPROLOL SUCCINATE PO      omeprazole (PRILOSEC) 20 MG CR capsule Take 20 mg by mouth as needed      VITAMIN D, CHOLECALCIFEROL, PO Take by mouth daily     No current facility-administered medications for this visit.      No Known Allergies    Past medical, surgical, social and family histories were reviewed and updated in EPIC.    ROS:   12 point review of systems negative other than symptoms noted below.    EXAM:  /72   Ht 1.613 m (5' 3.5\")   Wt 58.5 kg (129 lb)   BMI 22.49 kg/m     BMI: Body mass index is 22.49 kg/m .    PHYSICAL EXAM:  Constitutional:  Appearance: Well nourished, well developed, alert, in no acute distress  Neck:  Lymph Nodes:  No lymphadenopathy present    Thyroid:  Gland size normal, nontender, no nodules or masses present  on palpation  Chest:  Respiratory Effort:  Breathing unlabored  Cardiovascular:    Heart: Auscultation:  Regular rate, normal rhythm, no murmurs present  Breasts: Inspection of Breasts:  No lymphadenopathy present., Palpation of Breasts and Axillae:  No masses present on palpation, no breast tenderness., Axillary Lymph Nodes:  No lymphadenopathy present. and No nodularity, asymmetry or nipple discharge bilaterally.  Gastrointestinal:   Abdominal Examination:  Abdomen nontender to palpation, tone normal without rigidity or guarding, no masses present, umbilicus without lesions   Liver and Spleen:  No hepatomegaly present, liver nontender to palpation    Hernias:  No hernias present  Lymphatic: Lymph " Nodes:  No other lymphadenopathy present  Skin:  General Inspection:  No rashes present, no lesions present, no areas of  discoloration    Genitalia and Groin:  No rashes present, no lesions present, no areas of  discoloration, no masses present  Neurologic/Psychiatric:    Mental Status:  Oriented X3     Pelvic Exam:  External Genitalia:     Normal appearance for age, no discharge present, no tenderness present, no inflammatory lesions present, color normal  Vagina:     Normal vaginal vault without central or paravaginal defects, no discharge present, no inflammatory lesions present, no masses present  Bladder:     Nontender to palpation  Urethra:   Urethral Body:  Urethra palpation normal, urethra structural support normal   Urethral Meatus:  No erythema or lesions present  Cervix:     Appearance healthy, no lesions present, nontender to palpation, no bleeding present  Uterus:     Uterus: firm, normal sized and nontender, anteverted in position.   Adnexa:     No adnexal tenderness present, no adnexal masses present  Perineum:     Perineum within normal limits, no evidence of trauma, no rashes or skin lesions present  Anus:     Anus within normal limits, no hemorrhoids present  Inguinal Lymph Nodes:     No lymphadenopathy present  Pubic Hair:     Normal pubic hair distribution for age  Genitalia and Groin:     No rashes present, no lesions present, no areas of discoloration, no masses present      COUNSELING:   Reviewed preventive health counseling, as reflected in patient instructions       Osteoporosis Prevention/Bone Health    BMI: Body mass index is 22.49 kg/m .      ASSESSMENT:  64 year old female with satisfactory annual exam.    ICD-10-CM    1. Encounter for gynecological examination without abnormal finding Z01.419    2. Cervical high risk HPV (human papillomavirus) test positive R87.810 Pap imaged thin layer screen with HPV - recommended age 30 - 65     HPV High Risk Types DNA Cervical       PLAN:  -Pap/hpv  obtained for cervical cancer screening. Reviewed guidelines-pap q 3yrs until age 30 when co-testing q 5 years.  -Breast self awareness discussed. UTD for mammogram.  -Discussed exercise-making plan, strength training. Nutrition encouraged.  -Osteoporosis prevention discussed.  -PCP manages labs  -PMB precautions  -Return one year for next annual exam      Morelia Teran Masters, DO

## 2019-06-04 ASSESSMENT — ANXIETY QUESTIONNAIRES: GAD7 TOTAL SCORE: 0

## 2019-06-06 LAB
COPATH REPORT: NORMAL
PAP: NORMAL

## 2019-06-07 LAB
FINAL DIAGNOSIS: NORMAL
HPV HR 12 DNA CVX QL NAA+PROBE: NEGATIVE
HPV16 DNA SPEC QL NAA+PROBE: NEGATIVE
HPV18 DNA SPEC QL NAA+PROBE: NEGATIVE
SPECIMEN DESCRIPTION: NORMAL
SPECIMEN SOURCE CVX/VAG CYTO: NORMAL

## 2019-09-26 ENCOUNTER — HOSPITAL ENCOUNTER (EMERGENCY)
Facility: CLINIC | Age: 64
Discharge: HOME | End: 2019-09-26
Attending: EMERGENCY MEDICINE | Admitting: EMERGENCY MEDICINE
Payer: COMMERCIAL

## 2019-09-26 ENCOUNTER — APPOINTMENT (OUTPATIENT)
Dept: MRI IMAGING | Facility: CLINIC | Age: 64
End: 2019-09-26
Attending: EMERGENCY MEDICINE
Payer: COMMERCIAL

## 2019-09-26 VITALS
RESPIRATION RATE: 11 BRPM | BODY MASS INDEX: 21.85 KG/M2 | WEIGHT: 128 LBS | DIASTOLIC BLOOD PRESSURE: 89 MMHG | TEMPERATURE: 98.9 F | OXYGEN SATURATION: 96 % | HEART RATE: 72 BPM | SYSTOLIC BLOOD PRESSURE: 123 MMHG | HEIGHT: 64 IN

## 2019-09-26 DIAGNOSIS — R20.2 PARESTHESIA: ICD-10-CM

## 2019-09-26 LAB
ANION GAP SERPL CALCULATED.3IONS-SCNC: 4 MMOL/L (ref 3–14)
BASOPHILS # BLD AUTO: 0 10E9/L (ref 0–0.2)
BASOPHILS NFR BLD AUTO: 0.6 %
BUN SERPL-MCNC: 16 MG/DL (ref 7–30)
CALCIUM SERPL-MCNC: 9.6 MG/DL (ref 8.5–10.1)
CHLORIDE SERPL-SCNC: 106 MMOL/L (ref 94–109)
CO2 SERPL-SCNC: 30 MMOL/L (ref 20–32)
CREAT SERPL-MCNC: 0.74 MG/DL (ref 0.52–1.04)
DIFFERENTIAL METHOD BLD: NORMAL
EOSINOPHIL # BLD AUTO: 0.1 10E9/L (ref 0–0.7)
EOSINOPHIL NFR BLD AUTO: 1.4 %
ERYTHROCYTE [DISTWIDTH] IN BLOOD BY AUTOMATED COUNT: 13.1 % (ref 10–15)
GFR SERPL CREATININE-BSD FRML MDRD: 87 ML/MIN/{1.73_M2}
GLUCOSE SERPL-MCNC: 89 MG/DL (ref 70–99)
HCT VFR BLD AUTO: 41.8 % (ref 35–47)
HGB BLD-MCNC: 13.7 G/DL (ref 11.7–15.7)
IMM GRANULOCYTES # BLD: 0 10E9/L (ref 0–0.4)
IMM GRANULOCYTES NFR BLD: 0 %
INTERPRETATION ECG - MUSE: NORMAL
LYMPHOCYTES # BLD AUTO: 1.2 10E9/L (ref 0.8–5.3)
LYMPHOCYTES NFR BLD AUTO: 23.5 %
MCH RBC QN AUTO: 31.2 PG (ref 26.5–33)
MCHC RBC AUTO-ENTMCNC: 32.8 G/DL (ref 31.5–36.5)
MCV RBC AUTO: 95 FL (ref 78–100)
MONOCYTES # BLD AUTO: 0.4 10E9/L (ref 0–1.3)
MONOCYTES NFR BLD AUTO: 8.1 %
NEUTROPHILS # BLD AUTO: 3.4 10E9/L (ref 1.6–8.3)
NEUTROPHILS NFR BLD AUTO: 66.4 %
NRBC # BLD AUTO: 0 10*3/UL
NRBC BLD AUTO-RTO: 0 /100
PLATELET # BLD AUTO: 232 10E9/L (ref 150–450)
POTASSIUM SERPL-SCNC: 4.2 MMOL/L (ref 3.4–5.3)
RBC # BLD AUTO: 4.39 10E12/L (ref 3.8–5.2)
SODIUM SERPL-SCNC: 140 MMOL/L (ref 133–144)
TROPONIN I SERPL-MCNC: <0.015 UG/L (ref 0–0.04)
WBC # BLD AUTO: 5.1 10E9/L (ref 4–11)

## 2019-09-26 PROCEDURE — 93005 ELECTROCARDIOGRAM TRACING: CPT

## 2019-09-26 PROCEDURE — 84484 ASSAY OF TROPONIN QUANT: CPT | Performed by: EMERGENCY MEDICINE

## 2019-09-26 PROCEDURE — A9585 GADOBUTROL INJECTION: HCPCS | Performed by: EMERGENCY MEDICINE

## 2019-09-26 PROCEDURE — 80048 BASIC METABOLIC PNL TOTAL CA: CPT | Performed by: EMERGENCY MEDICINE

## 2019-09-26 PROCEDURE — 25500064 ZZH RX 255 OP 636: Performed by: EMERGENCY MEDICINE

## 2019-09-26 PROCEDURE — 99285 EMERGENCY DEPT VISIT HI MDM: CPT | Mod: 25

## 2019-09-26 PROCEDURE — 85025 COMPLETE CBC W/AUTO DIFF WBC: CPT | Performed by: EMERGENCY MEDICINE

## 2019-09-26 PROCEDURE — 70553 MRI BRAIN STEM W/O & W/DYE: CPT

## 2019-09-26 RX ORDER — GADOBUTROL 604.72 MG/ML
6 INJECTION INTRAVENOUS ONCE
Status: COMPLETED | OUTPATIENT
Start: 2019-09-26 | End: 2019-09-26

## 2019-09-26 RX ADMIN — GADOBUTROL 6 ML: 604.72 INJECTION INTRAVENOUS at 15:05

## 2019-09-26 ASSESSMENT — ENCOUNTER SYMPTOMS
NUMBNESS: 1
DIZZINESS: 1
LIGHT-HEADEDNESS: 1
HEADACHES: 1

## 2019-09-26 ASSESSMENT — MIFFLIN-ST. JEOR: SCORE: 1125.6

## 2019-09-26 NOTE — ED PROVIDER NOTES
History     Chief Complaint:  Hypertension      HPI   Kristi Aguero is a 64 year old female with a history of CVA and hypertension who presents to the ED for evaluation of hypertension and paresthesias. The patient reports that she has been experiencing intermittent tingling in her left arm for the past week. This is present from the left shoulder down to the fingers. This has been increasing in consistency, prompting her to visit the clinic 3 days ago. At that time laboratory workup was notable for elevated cholesterol levels and EKG was unremarkable. The patient was encouraged to undergo stress testing this afternoon and returned for blood pressure recheck this morning. BP was reported normal in clinic. However, the patient was driving home around 0930, when her arm tingling suddenly worsened. She also developed some bilateral jaw pain, lightheadedness, dizziness, and a posterior headache. She presented to the nearest urgent care, who noted her blood pressure to be in the 200's/100's. EMS was called to the clinic, who recorded a blood sugar of 88. Initial blood pressure was 160/80 upon arrival. Here in the ED, the patient endorses persistent left arm tingling that has been constant since 0930. Her headache and jaw symptoms have since resolved, and she denies any chest pain or shortness of breath today. Of note, the patient does have a history of stroke 20 years ago. At that time her symptoms included left arm numbness, jaw numbness, facial asymmetry, and difficulties speaking. She denies any residual effects. Her hypertension is currently controlled with metoprolol, atenolol, and aspirin 81 mg, and her pressures typically range from 120-143 systolic. She denies any cardiac history and has never undergone stress testing.    Allergies:  NKDA     Medications:    Aspirin  Atenolol  Vitamin B12  Metoprolol  Omeprazole  Vitamin D     Past Medical History:    Cervical high risk HPV test positive  CVA  Postural  "hypotension  Hypertension    Past Surgical History:    The patient does not have any pertinent past surgical history.     Family History:    Diabetes  Hypertension  Esophageal cancer  Endometrial cancer  Osteoporosis    Social History:  Smoking Status: Former smoker (1/2 pack per day for 6 years in college)  Alcohol Use: Yes (1-2 drinks per day)  Negative for drug use.   Marital Status:   [5]     Review of Systems   HENT:        Jaw pain   Neurological: Positive for dizziness, light-headedness, numbness (L arm) and headaches.   All other systems reviewed and are negative.      Physical Exam     Patient Vitals for the past 24 hrs:   BP Temp Temp src Pulse Heart Rate Resp SpO2 Height Weight   09/26/19 1400 126/77 -- -- 72 70 11 96 % -- --   09/26/19 1345 139/86 -- -- 70 73 15 98 % -- --   09/26/19 1335 (!) 139/109 -- -- 74 73 18 97 % -- --   09/26/19 1305 (!) 173/97 98.9  F (37.2  C) Oral 73 67 18 100 % 1.626 m (5' 4\") 58.1 kg (128 lb)        Physical Exam  General: Alert and cooperative with exam. Patient in mild distress. Normal mentation.  Head:  Scalp is NC/AT  Eyes:  No scleral icterus, PERRL, EOMI   ENT:  The external nose and ears are normal. The oropharynx is normal and without erythema; mucus membranes are moist.   Neck:  Normal range of motion without rigidity.  CV:  Regular rate and rhythm    No pathologic murmur   Resp:  Breath sounds are clear bilaterally    Non-labored, no retractions or accessory muscle use  GI:  Abdomen is soft, no distension, no tenderness. No peritoneal signs  MS:  No lower extremity edema     No midline cervical, thoracic, or lumbar tenderness  Skin:  Warm and dry, No rash or lesions noted.  Neuro: Oriented x 3. No gross motor deficits.    Strength and sensation grossly intact in all 4 extremities with exception of left arm paresthesias per patient.      Cranial nerves 2-12 intact.    GCS: 15    Emergency Department Course   ECG:  Indication: Hypertension   Time: 1333  Vent. " Rate 65 bpm. WV interval 148. QRS duration 78. QT/QTc 408/424. P-R-T axis 56 60 53.  Normal sinus rhythm. Normal ECG. No significant change compared to EKG dated 10/3/12. Read time: 1344     Imaging:  Radiographic findings were communicated with the patient who voiced understanding of the findings.  MR Brain with & without contrast:   Pending    Laboratory:  CBC: WBC: 5.1, HGB: 13.7, PLT: 232  BMP: All WNL (Creatinine: 0.74)    1330 Troponin: <0.015      Interventions:  Please see MAR for full list of medications administered in the ED.     Emergency Department Course:  Nursing notes and vitals reviewed. (1311) I performed an exam of the patient as documented above.     EKG obtained in the ED, see results above.      IV inserted. Blood drawn. This was sent to the lab for further testing, results above.    The patient was sent for a brain MRI while in the emergency department, findings above.     Findings and plan explained to the Patient. Patient signed out to the oncoming physician, Dr. Cifuentes, for further cares and final disposition pending results of MRI.    Impression & Plan    Medical Decision Making:  Patient is a 64-year-old female who presents with 1 week history of intermittent left arm paresthesia, which has become constant this morning, as well as mild headache (currently resolved); history of stroke with no residual deficits.  Patient's medical history and records were reviewed.  Initial consideration for, but not limited to, TIA/CVA, complex migraine, nerve compression, electrolyte abnormality, metabolic disturbance, arrhythmia, among others.  Labs, EKG, and imaging was obtained.  EKG demonstrated normal sinus rhythm without evidence of acute ischemia, infarction, or arrhythmia; no significant change from previous, and patient denies chest pain or shortness of breath and notes normal exercise tolerance; atypical ACS unlikely.  Labs unremarkable as noted above.  Given patient's persistent left arm  paresthesia in conjunction with history of previous stroke elected to obtain MRI brain, which is currently pending.  She was initially significant hypertensive and this resolved without intervention.  Patient will be signed out to my oncoming partner, Dr. Cifuentes.  Discussed with stroke neurology service, if MRI is negative, patient is to follow-up with PCP and [facility] as an outpatient for further evaluation; continue 81 mg aspirin daily.  Patient remained stable throughout my care.    Diagnosis:    ICD-10-CM    1. Paresthesia R20.2        Disposition:  Signed out to Dr. Cifuentes      Scribe Disclosure:  I, Tamika Flores, am serving as a scribe on 9/26/2019 at 1:11 PM to personally document services performed by Francis Diana DO based on my observations and the provider's statements to me.      Tamika Flores  9/26/2019    EMERGENCY DEPARTMENT       Francis Diana,   09/26/19 1501       Francis Diana,   09/26/19 1511

## 2019-09-26 NOTE — ED TRIAGE NOTES
Tingling sensations in left arm for more than 1 week. Blood work up and EKG done Monday. Scheduled for stress test and BP recheck today. Had BP recheck done. Normal. Was driving home when left arm tingling worsened and started having left and right sided jaw pain. Felt light headed, dizziness, and HA (posterior). Went to urgent care. Checked BP, was 200/100. 911 called. Blood sugar for EMS 88. BP now 160/80. Continues to have left arm symptoms. No jaw symptoms.

## 2019-09-26 NOTE — ED NOTES
Bed: ED01  Expected date:   Expected time:   Means of arrival:   Comments:  [facility] [place] - 438 - 64 F HTN eta 1300

## 2019-09-26 NOTE — ED AVS SNAPSHOT
Emergency Department  [address] [place] [id number]  Phone:  [phone]  Fax:  [phone]                                    Kristi Aguero   MRN: [id number]    Department:   Emergency Department   Date of Visit:  9/26/2019           After Visit Summary Signature Page    I have received my discharge instructions, and my questions have been answered. I have discussed any challenges I see with this plan with the nurse or doctor.    ..........................................................................................................................................  Patient/Patient Representative Signature      ..........................................................................................................................................  Patient Representative Print Name and Relationship to Patient    ..................................................               ................................................  Date                                   Time    ..........................................................................................................................................  Reviewed by Signature/Title    ...................................................              ..............................................  Date                                               Time          22EPIC Rev 08/18

## 2019-12-15 ENCOUNTER — HEALTH MAINTENANCE LETTER (OUTPATIENT)
Age: 64
End: 2019-12-15

## 2020-08-10 ENCOUNTER — OFFICE VISIT (OUTPATIENT)
Dept: OBGYN | Facility: CLINIC | Age: 65
End: 2020-08-10
Payer: COMMERCIAL

## 2020-08-10 VITALS
HEART RATE: 62 BPM | SYSTOLIC BLOOD PRESSURE: 116 MMHG | HEIGHT: 64 IN | DIASTOLIC BLOOD PRESSURE: 78 MMHG | WEIGHT: 123 LBS | BODY MASS INDEX: 21 KG/M2

## 2020-08-10 DIAGNOSIS — Z01.419 ENCOUNTER FOR GYNECOLOGICAL EXAMINATION WITHOUT ABNORMAL FINDING: Primary | ICD-10-CM

## 2020-08-10 PROCEDURE — 99396 PREV VISIT EST AGE 40-64: CPT | Performed by: OBSTETRICS & GYNECOLOGY

## 2020-08-10 SDOH — HEALTH STABILITY: MENTAL HEALTH: HOW MANY STANDARD DRINKS CONTAINING ALCOHOL DO YOU HAVE ON A TYPICAL DAY?: 1 OR 2

## 2020-08-10 SDOH — HEALTH STABILITY: MENTAL HEALTH: HOW OFTEN DO YOU HAVE A DRINK CONTAINING ALCOHOL?: 4 OR MORE TIMES A WEEK

## 2020-08-10 SDOH — HEALTH STABILITY: MENTAL HEALTH: HOW OFTEN DO YOU HAVE 6 OR MORE DRINKS ON ONE OCCASION?: LESS THAN MONTHLY

## 2020-08-10 ASSESSMENT — PATIENT HEALTH QUESTIONNAIRE - PHQ9
5. POOR APPETITE OR OVEREATING: NOT AT ALL
SUM OF ALL RESPONSES TO PHQ QUESTIONS 1-9: 0

## 2020-08-10 ASSESSMENT — ANXIETY QUESTIONNAIRES
1. FEELING NERVOUS, ANXIOUS, OR ON EDGE: NOT AT ALL
IF YOU CHECKED OFF ANY PROBLEMS ON THIS QUESTIONNAIRE, HOW DIFFICULT HAVE THESE PROBLEMS MADE IT FOR YOU TO DO YOUR WORK, TAKE CARE OF THINGS AT HOME, OR GET ALONG WITH OTHER PEOPLE: NOT DIFFICULT AT ALL
7. FEELING AFRAID AS IF SOMETHING AWFUL MIGHT HAPPEN: NOT AT ALL
6. BECOMING EASILY ANNOYED OR IRRITABLE: NOT AT ALL
2. NOT BEING ABLE TO STOP OR CONTROL WORRYING: NOT AT ALL
3. WORRYING TOO MUCH ABOUT DIFFERENT THINGS: NOT AT ALL
5. BEING SO RESTLESS THAT IT IS HARD TO SIT STILL: NOT AT ALL
GAD7 TOTAL SCORE: 0

## 2020-08-10 ASSESSMENT — MIFFLIN-ST. JEOR: SCORE: 1097.92

## 2020-08-10 NOTE — PROGRESS NOTES
Kristi is a 65 year old  female who presents for annual exam.     Besides routine health maintenance, she has no other health concerns today .    HPI:  The patient's PCP is  [facility].      No VB/spotting    Pelvic pain correlates with strenuous exercise she figured out. Not really bothered now that she knows the trigger.     GYNECOLOGIC HISTORY:    No LMP recorded. Patient is postmenopausal.        Her current contraception method is: menopause.  She  reports that she has quit smoking. She has never used smokeless tobacco.    Patient is not sexually active.  STD testing offered?  Declined  Last PHQ-9 score on record =   PHQ-9 SCORE 8/10/2020   PHQ-9 Total Score 0     Last GAD7 score on record =   ALCE-7 SCORE 8/10/2020   Total Score 0     Alcohol Score = 4    HEALTH MAINTENANCE:  Cholesterol:Labs has done PCP   Cholesterol   Date Value Ref Range Status   10/26/2011 221 (A) 115 - 199 mg/dL Final      Last Mammo: One year ago, Result: Normal, Next Mammo: 10/06/2020   Pap: (  Lab Results   Component Value Date    PAP NIL HPV-  2019    PAP NIL 2018    PAP NIL 2017      Colonoscopy:  2017, Result: Normal, Next Colonoscopy: 10 years.  Dexa:  6/18/15    Health maintenance updated:  yes    HISTORY:  OB History    Para Term  AB Living   4 3 2 1 1 3   SAB TAB Ectopic Multiple Live Births   1 0 0 0 3      # Outcome Date GA Lbr Alexx/2nd Weight Sex Delivery Anes PTL Lv   4   33w0d  1.984 kg (4 lb 6 oz) F    KAMILLE      Name: Verito   3 Term  39w0d  2.807 kg (6 lb 3 oz) M    KAMILLE      Name: Alden   2 Term  37w0d  2.551 kg (5 lb 10 oz) M    KAMILLE      Name: Surya   1 SAB                Patient Active Problem List   Diagnosis     Essential hypertension     Cerebral infarction (H)     Hypotension, postural     Cervical high risk HPV (human papillomavirus) test positive     Past Surgical History:   Procedure Laterality Date     NO HISTORY OF SURGERY       "  Social History     Tobacco Use     Smoking status: Former Smoker     Smokeless tobacco: Never Used     Tobacco comment: Quit 40+ years ago   Substance Use Topics     Alcohol use: Yes     Alcohol/week: 0.0 standard drinks     Frequency: 4 or more times a week     Drinks per session: 1 or 2     Binge frequency: Less than monthly      Problem (# of Occurrences) Relation (Name,Age of Onset)    Anxiety Disorder (1) Daughter    Colon Cancer (1) Paternal Grandfather (60)    Diabetes (1) Mother    Endometrial Cancer (1) Sister (50)    Esophageal Cancer (1) Mother    Hypertension (1) Mother    Osteoporosis (1) Father            Current Outpatient Medications   Medication Sig     ASPIRIN PO Take  by mouth.       atenolol (TENORMIN) 50 MG tablet Take 0.5 tablets by mouth daily.     Cyanocobalamin (VITAMIN B-12 PO)      METOPROLOL SUCCINATE PO      omeprazole (PRILOSEC) 20 MG CR capsule Take 20 mg by mouth as needed      VITAMIN D, CHOLECALCIFEROL, PO Take by mouth daily     No current facility-administered medications for this visit.      No Known Allergies    Past medical, surgical, social and family histories were reviewed and updated in EPIC.    ROS:   12 point review of systems negative other than symptoms noted below or in the HPI.  No urinary frequency or dysuria, bladder or kidney problems    EXAM:  /78   Pulse 62   Ht 1.626 m (5' 4\")   Wt 55.8 kg (123 lb)   BMI 21.11 kg/m     BMI: Body mass index is 21.11 kg/m .    PHYSICAL EXAM:  Constitutional:   Appearance: Well nourished, well developed, alert, in no acute distress  Neck:  Lymph Nodes:  No lymphadenopathy present    Thyroid:  Gland size normal, nontender, no nodules or masses present  on palpation  Chest:  Respiratory Effort:  Breathing unlabored  Cardiovascular:    Heart: Auscultation:  Regular rate, normal rhythm, no murmurs present  Breasts: Inspection of Breasts:  No lymphadenopathy present., Palpation of Breasts and Axillae:  No masses present on " palpation, no breast tenderness., Axillary Lymph Nodes:  No lymphadenopathy present. and No nodularity, asymmetry or nipple discharge bilaterally.  Gastrointestinal:   Abdominal Examination:  Abdomen nontender to palpation, tone normal without rigidity or guarding, no masses present, umbilicus without lesions   Liver and Spleen:  No hepatomegaly present, liver nontender to palpation    Hernias:  No hernias present  Lymphatic: Lymph Nodes:  No other lymphadenopathy present  Skin:  General Inspection:  No rashes present, no lesions present, no areas of  discoloration  Neurologic:    Mental Status:  Oriented X3.  Normal strength and tone, sensory exam                grossly normal, mentation intact and speech normal.    Psychiatric:   Mentation appears normal and affect normal/bright.         Pelvic Exam:  External Genitalia:     Normal appearance for age, no discharge present, no tenderness present, no inflammatory lesions present, color normal  Vagina:     Normal vaginal vault without central or paravaginal defects, no discharge present, no inflammatory lesions present, no masses present  Bladder:     Nontender to palpation  Urethra:   Urethral Body:  Urethra palpation normal, urethra structural support normal   Urethral Meatus:  No erythema or lesions present  Cervix:     Appearance healthy, no lesions present, nontender to palpation, no bleeding present  Uterus:     Uterus: firm, normal sized and nontender, midplane in position.   Adnexa:     No adnexal tenderness present, no adnexal masses present  Perineum:     Perineum within normal limits, no evidence of trauma, no rashes or skin lesions present  Anus:     Anus within normal limits, no hemorrhoids present  Inguinal Lymph Nodes:     No lymphadenopathy present  Pubic Hair:     Normal pubic hair distribution for age  Genitalia and Groin:     No rashes present, no lesions present, no areas of discoloration, no masses present      COUNSELING:   Reviewed preventive  health counseling, as reflected in patient instructions       Osteoporosis Prevention/Bone Health    BMI: Body mass index is 21.11 kg/m .      ASSESSMENT:  65 year old female with satisfactory annual exam.    ICD-10-CM    1. Encounter for gynecological examination without abnormal finding  Z01.419        PLAN:  -UTD for cervical cancer screening. Due for cotesting 2022  -Breast self awareness discussed.  DUe for mammogram.  -Discussed exercise-making plan, strength training. Nutrition encouraged.  -Colonoscopy UTD  -Osteoporosis prevention discussed. Dexa 2015. Due at age 67  -PMB precautions  -Return one year for next annual exam          Morelia Teran Masters, DO

## 2020-08-10 NOTE — PATIENT INSTRUCTIONS
-Daily total calcium intake (between food/supplements) should be 1200mg which equates to 5 servings calcium containing food per day; VItamin D 1000IU.   Foods rich in calcium are: milk, cheese, yogurt, seafood, sardines and canned salmon, leafy green vegetables such as mark greens, spinach and kale, beans and lentils, almonds, seeds (poppy, sesame, celery, ranjana), rhubarb, dried fruit such as figs, whey protein, tofu and edamame, amaranth, other foods with added calcium such as orange juice and some cereals.   If adequate amount not taken in diet, then a supplement may be needed.     -I also recommend increasing your dietary fiber by starting Metamucil (powder mixed in glass of water) twice daily

## 2020-08-11 ASSESSMENT — ANXIETY QUESTIONNAIRES: GAD7 TOTAL SCORE: 0

## 2020-10-06 ENCOUNTER — ANCILLARY PROCEDURE (OUTPATIENT)
Dept: MAMMOGRAPHY | Facility: CLINIC | Age: 65
End: 2020-10-06
Payer: COMMERCIAL

## 2020-10-06 DIAGNOSIS — Z12.31 VISIT FOR SCREENING MAMMOGRAM: ICD-10-CM

## 2020-10-06 PROCEDURE — 77063 BREAST TOMOSYNTHESIS BI: CPT | Performed by: RADIOLOGY

## 2020-10-06 PROCEDURE — 77067 SCR MAMMO BI INCL CAD: CPT | Performed by: RADIOLOGY

## 2021-09-04 ENCOUNTER — HEALTH MAINTENANCE LETTER (OUTPATIENT)
Age: 66
End: 2021-09-04

## 2021-10-07 ENCOUNTER — ANCILLARY PROCEDURE (OUTPATIENT)
Dept: MAMMOGRAPHY | Facility: CLINIC | Age: 66
End: 2021-10-07
Payer: COMMERCIAL

## 2021-10-07 ENCOUNTER — DOCUMENTATION ONLY (OUTPATIENT)
Dept: ONCOLOGY | Facility: CLINIC | Age: 66
End: 2021-10-07

## 2021-10-07 ENCOUNTER — OFFICE VISIT (OUTPATIENT)
Dept: OBGYN | Facility: CLINIC | Age: 66
End: 2021-10-07
Payer: COMMERCIAL

## 2021-10-07 VITALS
HEART RATE: 74 BPM | WEIGHT: 122 LBS | HEIGHT: 64 IN | BODY MASS INDEX: 20.83 KG/M2 | DIASTOLIC BLOOD PRESSURE: 78 MMHG | SYSTOLIC BLOOD PRESSURE: 122 MMHG

## 2021-10-07 DIAGNOSIS — Z80.41 FAMILY HISTORY OF OVARIAN CANCER: ICD-10-CM

## 2021-10-07 DIAGNOSIS — Z13.820 SCREENING FOR OSTEOPOROSIS: ICD-10-CM

## 2021-10-07 DIAGNOSIS — Z01.419 ENCOUNTER FOR GYNECOLOGICAL EXAMINATION WITHOUT ABNORMAL FINDING: Primary | ICD-10-CM

## 2021-10-07 DIAGNOSIS — Z12.31 VISIT FOR SCREENING MAMMOGRAM: ICD-10-CM

## 2021-10-07 PROCEDURE — 77067 SCR MAMMO BI INCL CAD: CPT | Mod: TC | Performed by: RADIOLOGY

## 2021-10-07 PROCEDURE — 99396 PREV VISIT EST AGE 40-64: CPT | Performed by: OBSTETRICS & GYNECOLOGY

## 2021-10-07 PROCEDURE — 77063 BREAST TOMOSYNTHESIS BI: CPT | Mod: TC | Performed by: RADIOLOGY

## 2021-10-07 ASSESSMENT — ANXIETY QUESTIONNAIRES
3. WORRYING TOO MUCH ABOUT DIFFERENT THINGS: NOT AT ALL
7. FEELING AFRAID AS IF SOMETHING AWFUL MIGHT HAPPEN: NOT AT ALL
IF YOU CHECKED OFF ANY PROBLEMS ON THIS QUESTIONNAIRE, HOW DIFFICULT HAVE THESE PROBLEMS MADE IT FOR YOU TO DO YOUR WORK, TAKE CARE OF THINGS AT HOME, OR GET ALONG WITH OTHER PEOPLE: NOT DIFFICULT AT ALL
2. NOT BEING ABLE TO STOP OR CONTROL WORRYING: NOT AT ALL
5. BEING SO RESTLESS THAT IT IS HARD TO SIT STILL: NOT AT ALL
1. FEELING NERVOUS, ANXIOUS, OR ON EDGE: NOT AT ALL
6. BECOMING EASILY ANNOYED OR IRRITABLE: NOT AT ALL
GAD7 TOTAL SCORE: 0

## 2021-10-07 ASSESSMENT — MIFFLIN-ST. JEOR: SCORE: 1088.39

## 2021-10-07 ASSESSMENT — PATIENT HEALTH QUESTIONNAIRE - PHQ9
SUM OF ALL RESPONSES TO PHQ QUESTIONS 1-9: 0
5. POOR APPETITE OR OVEREATING: NOT AT ALL

## 2021-10-07 NOTE — PROGRESS NOTES
Kristi is a 66 year old  female who presents for annual exam.     Besides routine health maintenance, she has no other health concerns today .    HPI:  [facility].      Plans to see PCP soon. Will do flu vacc there.     No vb/discharge.     Sister diagnosed with peritoneal cancer. Not sure what results were of genetic testing done. States thinks she is low risk.  Other sister with Endometrial ca did not get genetic testing.   Family history updated.       GYNECOLOGIC HISTORY:    No LMP recorded. Patient is postmenopausal.        Her current contraception method is: menopause.  She  reports that she has quit smoking. She has never used smokeless tobacco.    Patient is not sexually active.  STD testing offered?  Declined  Last PHQ-9 score on record =   PHQ-9 SCORE 10/7/2021   PHQ-9 Total Score 0     Last GAD7 score on record =   ALEC-7 SCORE 10/7/2021   Total Score 0     Alcohol Score = 1    HEALTH MAINTENANCE:  Cholesterol 2020   Total= 255, Triglycerides=10, HDL=92, JZO=021, FBS=91,     Last Mammo: One year ago, Result: Normal, Next Mammo: Today   Pap:   Lab Results   Component Value Date    PAP NIL HPV-  2019    PAP NIL Other HR HPV + 2018    PAP NIL 2017    Colonoscopy:  5/15/2017, Result: Normal, Next Colonoscopy: 10 years.  Dexa: 6/18/15    Health maintenance updated:  yes    HISTORY:  OB History    Para Term  AB Living   4 3 2 1 1 3   SAB TAB Ectopic Multiple Live Births   1 0 0 0 3      # Outcome Date GA Lbr Alexx/2nd Weight Sex Delivery Anes PTL Lv   4   33w0d  1.984 kg (4 lb 6 oz) F    KAMILLE      Name: Verito   3 Term  39w0d  2.807 kg (6 lb 3 oz) M    KAMILLE      Name: Alden   2 Term  37w0d  2.551 kg (5 lb 10 oz) M    KAMILLE      Name: Surya   1 SAB                Patient Active Problem List   Diagnosis     Essential hypertension     Cerebral infarction (H)     Hypotension, postural     Cervical high risk HPV  "(human papillomavirus) test positive     Past Surgical History:   Procedure Laterality Date     NO HISTORY OF SURGERY        Social History     Tobacco Use     Smoking status: Former Smoker     Smokeless tobacco: Never Used     Tobacco comment: Quit 40+ years ago   Substance Use Topics     Alcohol use: Yes     Alcohol/week: 0.0 standard drinks      Problem (# of Occurrences) Relation (Name,Age of Onset)    Anxiety Disorder (1) Daughter    Colon Cancer (1) Paternal Grandfather (60)    Diabetes (1) Mother    Endometrial Cancer (1) Sister (50)    Esophageal Cancer (1) Mother    Hypertension (1) Mother    Osteoporosis (1) Father    Ovarian Cancer (1) Sister: Peritoneal            Current Outpatient Medications   Medication Sig     ASPIRIN PO Take  by mouth.       atenolol (TENORMIN) 50 MG tablet Take 0.5 tablets by mouth daily.     Cyanocobalamin (VITAMIN B-12 PO)      METOPROLOL SUCCINATE PO      omeprazole (PRILOSEC) 20 MG CR capsule Take 20 mg by mouth as needed  (Patient not taking: Reported on 10/7/2021)     VITAMIN D, CHOLECALCIFEROL, PO Take by mouth daily     No current facility-administered medications for this visit.     No Known Allergies    Past medical, surgical, social and family histories were reviewed and updated in EPIC.    ROS:   12 point review of systems negative other than symptoms noted below or in the HPI.  No urinary frequency or dysuria, bladder or kidney problems    EXAM:  /78   Pulse 74   Ht 1.626 m (5' 4\")   Wt 55.3 kg (122 lb)   BMI 20.94 kg/m     BMI: Body mass index is 20.94 kg/m .    PHYSICAL EXAM:  Constitutional:   Appearance: Well nourished, well developed, alert, in no acute distress  Neck:  Lymph Nodes:  No lymphadenopathy present    Thyroid:  Gland size normal, nontender, no nodules or masses present  on palpation  Chest:  Respiratory Effort:  Breathing unlabored  Cardiovascular:    Heart: Auscultation:  Regular rate, normal rhythm, no murmurs present  Breasts: Inspection " of Breasts:  No lymphadenopathy present., Palpation of Breasts and Axillae:  No masses present on palpation, no breast tenderness., Axillary Lymph Nodes:  No lymphadenopathy present. and No nodularity, asymmetry or nipple discharge bilaterally.  Gastrointestinal:   Abdominal Examination:  Abdomen nontender to palpation, tone normal without rigidity or guarding, no masses present, umbilicus without lesions   Liver and Spleen:  No hepatomegaly present, liver nontender to palpation    Hernias:  No hernias present  Lymphatic: Lymph Nodes:  No other lymphadenopathy present  Skin:  General Inspection:  No rashes present, no lesions present, no areas of  discoloration  Neurologic:    Mental Status:  Oriented X3.  Normal strength and tone, sensory exam                grossly normal, mentation intact and speech normal.    Psychiatric:   Mentation appears normal and affect normal/bright.         Pelvic Exam:  External Genitalia:     Normal appearance for age, no discharge present, no tenderness present, no inflammatory lesions present, color normal  Vagina:     Normal vaginal vault without central or paravaginal defects, no discharge present, no inflammatory lesions present, no masses present  Bladder:     Nontender to palpation  Urethra:   Urethral Body:  Urethra palpation normal, urethra structural support normal   Urethral Meatus:  No erythema or lesions present  Cervix:     Appearance healthy, no lesions present, nontender to palpation, no bleeding present  Uterus:     Uterus: firm, normal sized and nontender, midplane in position.   Adnexa:     No adnexal tenderness present, no adnexal masses present  Perineum:     Perineum within normal limits, no evidence of trauma, no rashes or skin lesions present  Anus:     Anus within normal limits, no hemorrhoids present  Inguinal Lymph Nodes:     No lymphadenopathy present  Pubic Hair:     Normal pubic hair distribution for age  Genitalia and Groin:     No rashes present, no  lesions present, no areas of discoloration, no masses present      COUNSELING:   Reviewed preventive health counseling, as reflected in patient instructions       Osteoporosis prevention/bone health    BMI: Body mass index is 20.94 kg/m .      ASSESSMENT:  66 year old female with satisfactory annual exam.    ICD-10-CM    1. Encounter for gynecological examination without abnormal finding  Z01.419    2. Family history of ovarian cancer  Z80.41 Cancer Risk Mgmt/Cancer Genetic Counseling Referral       PLAN:  -UTD for cervical cancer screening. Due 2022  -Breast self awareness discussed. UTD for mammogram.  -Discussed exercise-making plan, strength training. Nutrition encouraged.  -Osteoporosis prevention discussed. Dexa ordered for in the next year.   -PCP manages labs  -PMB precautions  -Referred for genetic risk management due to fhx gyn malignancies  -Return one year for next annual exam          Morelia Teran Masters, DO

## 2021-10-08 ASSESSMENT — ANXIETY QUESTIONNAIRES: GAD7 TOTAL SCORE: 0

## 2021-10-13 ENCOUNTER — ANCILLARY PROCEDURE (OUTPATIENT)
Dept: BONE DENSITY | Facility: CLINIC | Age: 66
End: 2021-10-13
Attending: OBSTETRICS & GYNECOLOGY
Payer: COMMERCIAL

## 2021-10-13 DIAGNOSIS — Z13.820 SCREENING FOR OSTEOPOROSIS: ICD-10-CM

## 2021-10-13 PROCEDURE — 77080 DXA BONE DENSITY AXIAL: CPT | Performed by: OBSTETRICS & GYNECOLOGY

## 2021-11-08 ENCOUNTER — TELEPHONE (OUTPATIENT)
Dept: ONCOLOGY | Facility: CLINIC | Age: 66
End: 2021-11-08
Payer: COMMERCIAL

## 2021-11-08 NOTE — TELEPHONE ENCOUNTER
I called the patient and left a VM for her to call and reschedule her appt with Soniya Ordonez. Garcia Ordonez IB reschedule pt in one of her rtn spots.

## 2022-01-19 NOTE — PROGRESS NOTES
1/20/2022    Kristi is a 66 year old who is being evaluated via a billable video visit.      Video-Visit Details    Type of service: Video Visit    Video Start Time: 11:07 am  Video End Time: 11:46 am    Originating Location (pt. Location): Home    Distant Location (provider location): Cancer Risk Management Program    Platform used for Video Visit: [employer]    Referring Provider: Morelia Crabtree DO    Presenting Information:   I spoke with Kristi Aguero over video today for genetic counseling to discuss her family history of cancer. With her permission, this appointment was conducted virtually due to COVID-19 precautions. We talked today to review this history, cancer screening recommendations, and available genetic testing options.    Personal History:  Kristi is a 66 year old female. She does not have any personal history of cancer.    She had her first menstrual period around age 15, her first child at age 28, and is postmenopausal. Kristi has her ovaries, fallopian tubes and uterus in place. She had multiple endometrial biopsies in 2018 with benign polyps. She reports that she has not used hormone replacement therapy. She has used oral contraceptives. She has clinical breast exams and mammograms; her most recent mammogram on 10/7/21 was negative. Her most recent colonoscopy on 05/16/2017 detected one hyperplastic polyp and follow-up was recommended in 10 years. She had one previous colonoscopy in 2007 and is not sure if any polyps were detected (report not available for review today). Kristi reported former tobacco use for 5 years (quit in 1982) and alcohol use of 10 drinks per week.     Family History: (Please see scanned pedigree for detailed family history information)  Siblings:    Her sister is 69 years old and was diagnosed with peritoneal cancer at age 68. Kristi believes that she did have some genetic testing at the time of her diagnosis, but she has limited details about this. She will try to find out more  information from her sister.     Her other sister is 70 years old and was diagnosed with endometrial cancer at age 57. Treatment included SHITAL/BSO. She has not had any genetic testing.   Maternal:    Her mother was diagnosed with esophageal cancer at age 88 and passed away at age 90. She had a history of colon polyps. She had a long time history of smoking.    Her mother had only one brother who passed away at age 72 with no known history of cancer.     She is not aware of any other diagnoses of cancer in her maternal relatives.   Paternal:    Her father passed away at age 83 with no known history of cancer. He had a history of colon polyps. He also had a history of an AVM and seizure disorder.     Her father had only one brother who passed away at age 76 with no known history of cancer.     His daughter (Kristi's cousin) is 73 years old and was diagnosed with breast cancer within the last 5 years. She was then diagnosed with a breast cancer in the other breast within the last 2 years. She has not had any known genetic testing.     Her grandfather was diagnosed with prostate cancer at an unknown age. He passed away at age 72.    Her maternal ethnicity is English, [place], [place]. Her paternal ethnicity is English, [place], [place]. There is no known Ashkenazi Taoism ancestry on either side of her family.     Discussion:    Kristi's family history of cancer is suggestive of a hereditary cancer syndrome.    We reviewed the features of sporadic, familial, and hereditary cancers. In looking at Kristi's family history, it is possible that a cancer susceptibility gene is present due to her sister's recent diagnosis of peritoneal cancer and her family history of uterine cancer, colon polyps, breast, and prostate cancer. She also has a small family, with each of her parents only having one brother. This may limit risk assessment, particularly for gynecologic cancers.    We discussed the natural history and genetics of hereditary cancer.  We discussed genes in which mutations are associated with peritoneal and other gynecologic cancers, including the BRCA1 and BRCA2 genes. Mutations in these genes cause a condition known as Hereditary Breast and Ovarian Cancer syndrome (HBOC). Women with a mutation in either of these genes are at increased risk for breast and ovarian cancer. There is also an increased risk for a second primary breast cancer. Men with a mutation in either of these genes are at increased risk for breast and prostate cancer. Both women and men may also be at increased risk for pancreatic cancer and melanoma. A detailed handout regarding these genes and other genes in which mutations are associated with an increased risk for breast and gynecologic cancer will be provided to Kristi via [employer] and can be found in the after visit summary. Topics included: inheritance pattern, cancer risks, cancer screening recommendations, and also risks, benefits and limitations of testing.    Based on her personal and family history, Kristi meets current National Comprehensive Cancer Network (NCCN) criteria for genetic testing of ovarian cancer susceptibility genes.      We discussed that there are additional genes that could cause increased risk for gynecologic, breast, colon polyps/cancer, and other cancers. As many of these genes present with overlapping features in a family and accurate cancer risk cannot always be established based upon the pedigree analysis alone, it would be reasonable for Kristi to consider panel genetic testing to analyze multiple genes at once.    We reviewed genetic testing options for Kristi based on her personal and family history: a panel of genes associated with an increased risk for breast and gynecologic cancers, or larger panel options to include genes associated with increased risk for multiple different cancer types. She expressed an interest in more focused testing and opted for the BRCANext-Expanded Panel.  Genetic testing is  available for 23 genes associated with hereditary gynecologic, breast, and related cancers: BRCANext-Expanded (KAZ, BARD1, BRCA1, BRCA2, BRIP1, CDH1, CHEK2, DICER1, EPCAM, MLH1, MSH2, MSH6, NBN, NF1, PALB2, PMS2, PTEN, RAD51C, RAD51D, RECQL, SMARCA4, STK11, TP53).  We discussed that some of the genes in the BRCANext-Expanded panel are associated with specific hereditary cancer syndromes and published management guidelines: Hereditary Breast and Ovarian Cancer syndrome (BRCA1, BRCA2), Goldsmith syndrome (MLH1, MSH2, MSH6, PMS2, EPCAM), Hereditary Diffuse Gastric Cancer (CDH1), Cowden syndrome (PTEN), Li Fraumeni syndrome (TP53), Peutz-Jeghers syndrome (STK11), and Neurofibromatosis type 1 (NF1).    Risk-reducing salpingo-oophorectomy can be considered in women with mutations in BRIP1, RAD51C, or RAD51D. Breast and/or other cancer risk management guidelines are available for KAZ, CHEK2, PALB2, NF1, and NBN.  The remaining genes (BARD1, DICER1, RECQL, and SMARCA4) are associated with increased cancer risk and may allow us to make medical recommendations when mutations are identified.      Due to COVID-19 precautions consent was obtained over the phone/video today. Genetic testing via the BRCANext-Expanded Panel will be sent to [facility]. Kristi opted to have a saliva sample collection kit shipped directly to her home from [facility]. She will ship the kit back to [facility] for analysis. Turnaround time from date when sample is received at the lab: approximately 3-4 weeks.    Medical Management: For Kristi, we reviewed that the information from genetic testing may determine:    additional cancer screening for which Kristi may qualify (i.e. mammogram and breast MRI, more frequent colonoscopies, more frequent dermatologic exams, etc.),    options for risk reducing surgeries Kristi could consider (i.e. bilateral mastectomy, surgery to remove her ovaries and/or uterus, etc.),      and targeted chemotherapies if she were to  develop certain cancers in the future (i.e. immunotherapy for individuals with Goldsmith syndrome, PARP inhibitors, etc.).     These recommendations will be discussed in detail once genetic testing is completed.     Plan:  1) Today Kristi elected to proceed with genetic testing via the BRCANext-Expanded Panel offered by [employer].  2) This information should be available in 4-5 weeks.  3) Kristi will be scheduled for a virtual visit (phone or video) to discuss the results.    Time spent over video: 39 minutes    Soniya Ordonez MS, [facility] [place]  Licensed, Certified Genetic Counselor  Office: [phone]  Email: [email or web address]

## 2022-01-20 ENCOUNTER — VIRTUAL VISIT (OUTPATIENT)
Dept: ONCOLOGY | Facility: CLINIC | Age: 67
End: 2022-01-20
Attending: OBSTETRICS & GYNECOLOGY
Payer: COMMERCIAL

## 2022-01-20 DIAGNOSIS — Z83.719 FAMILY HISTORY OF COLONIC POLYPS: ICD-10-CM

## 2022-01-20 DIAGNOSIS — Z80.3 FAMILY HISTORY OF MALIGNANT NEOPLASM OF BREAST: ICD-10-CM

## 2022-01-20 DIAGNOSIS — Z80.49 FAMILY HISTORY OF UTERINE CANCER: ICD-10-CM

## 2022-01-20 DIAGNOSIS — Z80.42 FAMILY HISTORY OF PROSTATE CANCER: ICD-10-CM

## 2022-01-20 DIAGNOSIS — Z80.41 FAMILY HISTORY OF OVARIAN CANCER: Primary | ICD-10-CM

## 2022-01-20 PROCEDURE — 96040 HC GENETIC COUNSELING, EACH 30 MINUTES: CPT | Mod: GT,95 | Performed by: GENETIC COUNSELOR, MS

## 2022-01-20 NOTE — LETTER
Cancer Risk Management  Program Locations    [facility]  [facility]  [facility]  [facility]  Mailing Address  Cancer Risk Management Program  [facility]  [address]  [place] [address]    New patient appointments  [phone]  January 20, 2022    Kristi Aguero  [address] [id number]      Dear Kristi,    It was a pleasure speaking with you over video for genetic counseling on 1/20/2022. Here is a copy of the progress note from our discussion. If you have any additional questions, please feel free to call.    Referring Provider: Morelia Crabtree,     Presenting Information:   I spoke with Kristi Aguero over video today for genetic counseling to discuss her family history of cancer. With her permission, this appointment was conducted virtually due to COVID-19 precautions. We talked today to review this history, cancer screening recommendations, and available genetic testing options.    Personal History:  Kristi is a 66 year old female. She does not have any personal history of cancer.    She had her first menstrual period around age 15, her first child at age 28, and is postmenopausal. Kristi has her ovaries, fallopian tubes and uterus in place. She had multiple endometrial biopsies in 2018 with benign polyps. She reports that she has not used hormone replacement therapy. She has used oral contraceptives. She has clinical breast exams and mammograms; her most recent mammogram on 10/7/21 was negative. Her most recent colonoscopy on 05/16/2017 detected one hyperplastic polyp and follow-up was recommended in 10 years. She had one previous colonoscopy in 2007 and is not sure if any polyps were detected (report not available for review today). Kristi reported former tobacco use for 5 years (quit in 1982) and alcohol use of 10 drinks per  week.     Family History: (Please see scanned pedigree for detailed family history information)  Siblings:    Her sister is 69 years old and was diagnosed with peritoneal cancer at age 68. Kristi believes that she did have some genetic testing at the time of her diagnosis, but she has limited details about this. She will try to find out more information from her sister.     Her other sister is 70 years old and was diagnosed with endometrial cancer at age 57. Treatment included SHITAL/BSO. She has not had any genetic testing.   Maternal:    Her mother was diagnosed with esophageal cancer at age 88 and passed away at age 90. She had a history of colon polyps. She had a long time history of smoking.    Her mother had only one brother who passed away at age 72 with no known history of cancer.     She is not aware of any other diagnoses of cancer in her maternal relatives.   Paternal:    Her father passed away at age 83 with no known history of cancer. He had a history of colon polyps. He also had a history of an AVM and seizure disorder.     Her father had only one brother who passed away at age 76 with no known history of cancer.     His daughter (Kristi's cousin) is 73 years old and was diagnosed with breast cancer within the last 5 years. She was then diagnosed with a breast cancer in the other breast within the last 2 years. She has not had any known genetic testing.     Her grandfather was diagnosed with prostate cancer at an unknown age. He passed away at age 72.    Her maternal ethnicity is English, [place]ian, [place]. Her paternal ethnicity is English, [place]ian, [place]. There is no known Ashkenazi Evangelical ancestry on either side of her family.     Discussion:    Kristi's family history of cancer is suggestive of a hereditary cancer syndrome.    We reviewed the features of sporadic, familial, and hereditary cancers. In looking at Kristi's family history, it is possible that a cancer susceptibility gene is present due to her sister's  recent diagnosis of peritoneal cancer and her family history of uterine cancer, colon polyps, breast, and prostate cancer. She also has a small family, with each of her parents only having one brother. This may limit risk assessment, particularly for gynecologic cancers.    We discussed the natural history and genetics of hereditary cancer. We discussed genes in which mutations are associated with peritoneal and other gynecologic cancers, including the BRCA1 and BRCA2 genes. Mutations in these genes cause a condition known as Hereditary Breast and Ovarian Cancer syndrome (HBOC). Women with a mutation in either of these genes are at increased risk for breast and ovarian cancer. There is also an increased risk for a second primary breast cancer. Men with a mutation in either of these genes are at increased risk for breast and prostate cancer. Both women and men may also be at increased risk for pancreatic cancer and melanoma. A detailed handout regarding these genes and other genes in which mutations are associated with an increased risk for breast and gynecologic cancer will be provided to Kristi via [employer] and can be found in the after visit summary. Topics included: inheritance pattern, cancer risks, cancer screening recommendations, and also risks, benefits and limitations of testing.    Based on her personal and family history, Kristi meets current National Comprehensive Cancer Network (NCCN) criteria for genetic testing of ovarian cancer susceptibility genes.      We discussed that there are additional genes that could cause increased risk for gynecologic, breast, colon polyps/cancer, and other cancers. As many of these genes present with overlapping features in a family and accurate cancer risk cannot always be established based upon the pedigree analysis alone, it would be reasonable for Kristi to consider panel genetic testing to analyze multiple genes at once.    We reviewed genetic testing options for Kristi based on  her personal and family history: a panel of genes associated with an increased risk for breast and gynecologic cancers, or larger panel options to include genes associated with increased risk for multiple different cancer types. She expressed an interest in more focused testing and opted for the BRCANext-Expanded Panel.  Genetic testing is available for 23 genes associated with hereditary gynecologic, breast, and related cancers: BRCANext-Expanded (KAZ, BARD1, BRCA1, BRCA2, BRIP1, CDH1, CHEK2, DICER1, EPCAM, MLH1, MSH2, MSH6, NBN, NF1, PALB2, PMS2, PTEN, RAD51C, RAD51D, RECQL, SMARCA4, STK11, TP53).  We discussed that some of the genes in the BRCANext-Expanded panel are associated with specific hereditary cancer syndromes and published management guidelines: Hereditary Breast and Ovarian Cancer syndrome (BRCA1, BRCA2), Goldsmith syndrome (MLH1, MSH2, MSH6, PMS2, EPCAM), Hereditary Diffuse Gastric Cancer (CDH1), Cowden syndrome (PTEN), Li Fraumeni syndrome (TP53), Peutz-Jeghers syndrome (STK11), and Neurofibromatosis type 1 (NF1).    Risk-reducing salpingo-oophorectomy can be considered in women with mutations in BRIP1, RAD51C, or RAD51D. Breast and/or other cancer risk management guidelines are available for KAZ, CHEK2, PALB2, NF1, and NBN.  The remaining genes (BARD1, DICER1, RECQL, and SMARCA4) are associated with increased cancer risk and may allow us to make medical recommendations when mutations are identified.      Due to COVID-19 precautions consent was obtained over the phone/video today. Genetic testing via the BRCANext-Expanded Panel will be sent to [facility]. Kristi opted to have a saliva sample collection kit shipped directly to her home from [facility]. She will ship the kit back to [facility] for analysis. Turnaround time from date when sample is received at the lab: approximately 3-4 weeks.    Medical Management: For Kristi, we reviewed that the information from genetic testing may  determine:    additional cancer screening for which Kristi may qualify (i.e. mammogram and breast MRI, more frequent colonoscopies, more frequent dermatologic exams, etc.),    options for risk reducing surgeries Kristi could consider (i.e. bilateral mastectomy, surgery to remove her ovaries and/or uterus, etc.),      and targeted chemotherapies if she were to develop certain cancers in the future (i.e. immunotherapy for individuals with Goldsmith syndrome, PARP inhibitors, etc.).     These recommendations will be discussed in detail once genetic testing is completed.     Plan:  1) Today Kristi elected to proceed with genetic testing via the BRCANext-Expanded Panel offered by [employer].  2) This information should be available in 4-5 weeks.  3) Kristi will be scheduled for a virtual visit (phone or video) to discuss the results.    Soniya Ordonez MS, [facility] [place]  Licensed, Certified Genetic Counselor  Office: [phone]  Email: [email or web address]

## 2022-01-20 NOTE — PATIENT INSTRUCTIONS
Assessing Cancer Risk  Only about 5-10% of cancers are thought to be due to an inherited cancer susceptibility gene.    These families often have:    Several people with the same or related types of cancer    Cancers diagnosed at a young age (before age 50)    Individuals with more than one primary cancer    Multiple generations of the family affected with cancer    Some people may be candidates for genetic testing of more than one gene.  For these families, genetic testing using a cancer panel may be offered.  These panels will test different genes known to increase the risk for breast, ovarian, uterine, and/or other cancers. All of the genes discussed below have published clinical management guidelines for individuals who are found to carry a mutation. The purpose of this handout is to serve as a brief summary of the genes analyzed by the panels used to inquire about hereditary breast and gynecologic cancer:  KAZ, BRCA1, BRCA2, BRIP1, CDH1, CHEK2, MLH1, MSH2, MSH6, PMS2, EPCAM, PTEN, PALB2, RAD51C, RAD51D, and TP53.  ______________________________________________________________________________  Hereditary Breast and Ovarian Cancer Syndrome   (BRCA1 and BRCA2)  A single mutation in one of the copies of BRCA1 or BRCA2 increases the risk for breast and ovarian cancer, among others.  The risk for pancreatic cancer and melanoma may also be slightly increased in some families.  The chart below shows the chance that someone with a BRCA mutation would develop cancer in his or her lifetime1,2,3,4.        A person s ethnic background is also important to consider, as individuals of Ashkenazi Faith ancestry have a higher chance of having a BRCA gene mutation.  There are three BRCA mutations that occur more frequently in this population.    Goldsmith Syndrome   (MLH1, MSH2, MSH6, PMS2, and EPCAM)  Currently five genes are known to cause Goldsmith Syndrome: MLH1, MSH2, MSH6, PMS2, and EPCAM.  A single mutation in one of the  Goldsmith Syndrome genes increases the risk for colon, endometrial, ovarian, and stomach cancers.  Other cancers that occur less commonly in Goldsmith Syndrome include urinary tract, skin, and brain cancers.  The chart below shows the chance that a person with Goldsmith syndrome would develop cancer in his or her lifetime5.      *Cancer risk varies depending on Goldsmith syndrome gene found    Cowden Syndrome   (PTEN)  Cowden syndrome is a hereditary condition that increases the risk for breast, thyroid, endometrial, colon, and kidney cancer.  Cowden syndrome is caused by a mutation in the PTEN gene.  A single mutation in one of the copies of PTEN causes Cowden syndrome and increases cancer risk.  The chart below shows the chance that someone with a PTEN mutation would develop cancer in their lifetime6,7.  Other benign features seen in some individuals with Cowden syndrome include benign skin lesions (facial papules, keratoses, lipomas), learning disability, autism, thyroid nodules, colon polyps, and larger head size.      *One recent study found breast cancer risk to be increased to 85%    Li-Fraumeni Syndrome   (TP53)  Li-Fraumeni Syndrome (LFS) is a cancer predisposition syndrome caused by a mutation in the TP53 gene. A single mutation in one of the copies of TP53 increases the risk for multiple cancers. Individuals with LFS are at an increased risk for developing cancer at a young age. The lifetime risk for development of a LFS-associated cancer is 50% by age 30 and 90% by age 60.   Core Cancers: Sarcomas, Breast, Brain, Lung, Leukemias/Lymphomas, Adrenocortical carcinomas  Other Cancers: Gastrointestinal, Thyroid, Skin, Genitourinary    Hereditary Diffuse Gastric Cancer   (CDH1)  Currently, one gene is known to cause hereditary diffuse gastric cancer (HDGC): CDH1.  Individuals with HDGC are at increased risk for diffuse gastric cancer and lobular breast cancer. Of people diagnosed with HDGC, 30-50% have a mutation in the CDH1  gene.  This suggests there are likely other genes that may cause HDGC that have not been identified yet.      Lifetime Cancer Risks    General Population HDGC    Diffuse Gastric  <1% ~80%   Breast 12% 39-52%         Additional Genes  KAZ  KAZ is a moderate-risk breast cancer gene. Women who have a mutation in KAZ can have between a 2-4 fold increased risk for breast cancer compared to the general population8. KAZ mutations have also been associated with increased risk for pancreatic cancer, however an estimate of this cancer risk is not well understood9. Individuals who inherit two KAZ mutations have a condition called ataxia-telangiectasia (AT).  This rare autosomal recessive condition affects the nervous system and immune system, and is associated with progressive cerebellar ataxia beginning in childhood.  Individuals with ataxia-telangiectasia often have a weakened immune system and have an increased risk for childhood cancers.    PALB2  Mutations in PALB2 have been shown to increase the risk of breast cancer up to 33-58% in some families; where individuals fall within this risk range is dependent upon family [id number]. PALB2 mutations have also been associated with increased risk for pancreatic cancer, although this risk has not been quantified yet.  Individuals who inherit two PALB2 mutations--one from their mother and one from their father--have a condition called Fanconi Anemia.  This rare autosomal recessive condition is associated with short stature, developmental delay, bone marrow failure, and increased risk for childhood cancers.    CHEK2   CHEK2 is a moderate-risk breast cancer gene.  Women who have a mutation in CHEK2 have around a 2-fold increased risk for breast cancer compared to the general population, and this risk may be higher depending upon family history.11,12,13 Mutations in CHEK2 have also been shown to increase the risk of a number of other cancers, including colon and prostate, however  these cancer risks are currently not well understood.    BRIP1, RAD51C and RAD51D  Mutations in BRIP1, RAD51C, and RAD51D have been shown to increase the risk of ovarian cancer and possibly female breast cancer as well14,15 .       Lifetime Cancer Risk    General Population BRIP1 RAD51C RAD51D   Ovarian 1-2% ~5-8% ~5-9% ~7-15%           Inheritance  All of the cancer syndromes reviewed above are inherited in an autosomal dominant pattern.  This means that if a parent has a mutation, each of his or her children will have a 50% chance of inheriting that same mutation.  Therefore, each child--male or female--would have a 50% chance of being at increased risk for developing cancer.      Image obtained from Genetics Home Reference, 2013     Mutations in some genes can occur de deon, which means that a person s mutation occurred for the first time in them and was not inherited from a parent.  Now that they have the mutation, however, it can be passed on to future generations.    Genetic Testing  Genetic testing involves a blood test and will look at the genetic information in the KAZ, BRCA1, BRCA2, BRIP1, CDH1, CHEK2, MLH1, MSH2, MSH6, PMS2, EPCAM, PTEN, PALB2, RAD51C, RAD51D, and TP53 genes for any harmful mutations that are associated with increased cancer risk.  If possible, it is recommended that the person(s) who has had cancer be tested before other family members.  That person will give us the most useful information about whether or not a specific gene is associated with the cancer in the family.    Results  There are three possible results of genetic testing:    Positive--a harmful mutation was identified in one or more of the genes    Negative--no mutation was identified in any of the genes on this panel    Variant of unknown significance--a variation in one of the genes was identified, but it is unclear how this impacts cancer risk in the family    Advantages and Disadvantages   There are advantages and  disadvantages to genetic testing.    Advantages    May clarify your cancer risk    Can help you make medical decisions    May explain the cancers in your family    May give useful information to your family members (if you share your results)    Disadvantages    Possible negative emotional impact of learning about inherited cancer risk    Uncertainty in interpreting a negative test result in some situations    Possible genetic discrimination concerns (see below)    Genetic Information Nondiscrimination Act (COLUMBA)  [place] is a federal law that protects individuals from health insurance or employment discrimination based on a genetic test result alone.  Although rare, there are currently no legal discrimination protections in terms of life insurance, long term care, or disability insurances.  Visit the [employer] [place] website to learn more.    Reducing Cancer Risk  All of the genes described above have nationally recognized cancer screening guidelines that would be recommended for individuals who test positive.  In addition to increased cancer screening, surgeries may be offered or recommended to reduce cancer risk.  Recommendations are based upon an individual s genetic test result as well as their personal and family history of cancer.    Questions to Think About Regarding Genetic Testing:    What effect will the test result have on me and my relationship with my family members if I have an inherited gene mutation?  If I don t have a gene mutation?    Should I share my test results, and how will my family react to this news, which may also affect them?    Are my children ready to learn new information that may one day affect their own health?    Hereditary Cancer Resources    FORCE: Facing Our Risk of Cancer Empowered facingourrisk.org   Bright Pink bebrightpink.org   Li-Fraumeni Syndrome Association lfsassociation.org   PTEN World PTENworld.com   No stomach for cancer, Inc.  nostomachforcancer.org   Stomach cancer relief network Scrnet.org   Collaborative Group of the Americas on Inherited Colorectal Cancer (CGA) cgaicc.com    Cancer Care cancercare.org   American Cancer Society (ACS) cancer.org   National Cancer [place] (NCI) cancer.gov     Please call us if you have any questions or concerns.   Cancer Risk Management Program [phone] ([phone])  ? Suleman Duque, MS, [facility] [phone]  ? Billie Mccauley, MS, [facility]  [phone]  ? Yeni Cueva, MS, [facility]  [phone]  ? Nayely Mo, MS, [facility] [phone]  ? Morelia Mikki, MS, [facility] [phone]  ? Soniya José Luis, MS, [facility]  [phone]  ? Jyothi Meza, MS  [phone]    References  1. Sarah MARSHALL, Manish PDP, Anup S, Godfrey HOROWITZ, Terry JE, Mau JL, Melody N, Kike H, Sobia O, Linda A, Alexandria B, Radilaura P, Manmiguel S, Yon DM, Smallwood N, Bhavna E, Radha H, Javon E, Preston J, Grongigi J, May B, Kyra H, Thorlacius S, Eerola H, Nevmeganna H, Seth K, Jamie OP. Average risks of breast and ovarian cancer associated with BRCA1 or BRCA2 mutations detected in case series unselected for family history: a combined analysis of 222 studies. Am J Hum Carly. 2003;72:1117-30.  2. Sid N, Tere M, Carmen G.  BRCA1 and BRCA2 Hereditary Breast and Ovarian Cancer. Gene Reviews online. 2013.  3. Andrew YC, Saturnino S, Ellen G, Hamilton S. Breast cancer risk among male BRCA1 and BRCA2 mutation carriers. J Natl Cancer Inst. 2007;99:1811-4.  4. Ted PIERRE, Aureliano I, Daniel J, Halima E, Meghann ER, Becca F. Risk of breast cancer in male BRCA2 carriers. J Med Carly. 2010;47:710-1.  5. National Comprehensive Cancer Network. Clinical practice guidelines in oncology, colorectal cancer screening. Available online (registration required). 2015.  6. Donaldo HERNANDEZ, Dg J, Dora J, Krysta LA, Aakash GARZA, Eng C. Lifetime cancer risks in individuals with germline PTEN mutations. Clin Cancer Res. 2012;18:400-7.  7. Pilarski R. Cowden  Syndrome: A Critical Review of the Clinical Literature. J Carly . 2009:18:13-27.  8. Socrates A, Brenton D, Sugey S, Taylor P, Aparna T, Faustino M, Oc B, Kumar H, Luis R, Cassandra K, Jef L, Ted DG, Yon D, Ike DF, Kelly MR, The Breast Cancer Susceptibility Collaboration (UK) & Niru REYNOSO. KAZ mutations that cause ataxia-telangiectasia are breast cancer susceptibility alleles. Nature Genetics. 2006;38:873-875  9. Leo N , Milagro Y, Jocelynn J, Anjel L, Freddy GM , Khadar ML, Gallinger S, Jones AG, Syngal S, Miko ML, Tremayne J , Monica R, Maria Del Rosario SZ, Caryn JR, Carine VE, Randy M, Voharitha B, Aevl N, Sonja RH, Agustina KW, and Katie AP. KAZ mutations in patients with hereditary pancreatic cancer. Cancer Discover. 2012;2:41-46  10. Sarah TORRES, et al. Breast-Cancer Risk in Families with Mutations in PALB2. NEJM. 2014; 371(6):497-506.  11. CHEK2 Breast Cancer Case-Control Consortium. CHEK2*1100delC and susceptibility to breast cancer: A collaborative analysis involving 10,860 breast cancer cases and 9,065 controls from 10 studies. Am J Hum Carly, 74 (2004), pp. 0837-1872  12. Rubi T, Sofia S, Tim K, et al. Spectrum of Mutations in BRCA1, BRCA2, CHEK2, and TP53 in Families at High Risk of Breast Cancer. RAJEEV. 2006;295(12):9811-1108.   13. Leonel C, Vero D, Ruthann A, et al. Risk of breast cancer in women with a CHEK2 mutation with and without a family history of breast cancer. J Clin Oncol. 2011;29:3865-9541.  14. Jacob H, Sommer E, Jimmy SJ, et al. Contribution of germline mutations in the RAD51B, RAD51C, and RAD51D genes to ovarian cancer in the population. J Clin Oncol. 2015;33(26):0787-9690. Doi:10.1200/JCO.2015.61.2408.  15. Kori T, Lis SEAY, Taryn P, et al. Mutations in BRIP1 confer high risk of ovarian cancer. Melissa Carly. 2011;43(11):3457-8957. doi:10.1038/ng.955.

## 2022-02-04 ENCOUNTER — TELEPHONE (OUTPATIENT)
Dept: ONCOLOGY | Facility: CLINIC | Age: 67
End: 2022-02-04

## 2022-02-04 ENCOUNTER — MYC MEDICAL ADVICE (OUTPATIENT)
Dept: ONCOLOGY | Facility: CLINIC | Age: 67
End: 2022-02-04

## 2022-02-04 NOTE — TELEPHONE ENCOUNTER
2/4/22    Responded to question about genetic testing kit via [employer] message to patient.     Soniya Ordonez MS, [facility]  Licensed, Certified Genetic Counselor  Office: [phone]  Email: [email or web address]

## 2022-03-10 ENCOUNTER — VIRTUAL VISIT (OUTPATIENT)
Dept: ONCOLOGY | Facility: CLINIC | Age: 67
End: 2022-03-10
Payer: COMMERCIAL

## 2022-03-10 DIAGNOSIS — Z83.719 FAMILY HISTORY OF COLONIC POLYPS: ICD-10-CM

## 2022-03-10 DIAGNOSIS — Z80.49 FAMILY HISTORY OF UTERINE CANCER: ICD-10-CM

## 2022-03-10 DIAGNOSIS — Z80.41 FAMILY HISTORY OF OVARIAN CANCER: Primary | ICD-10-CM

## 2022-03-10 DIAGNOSIS — Z80.42 FAMILY HISTORY OF PROSTATE CANCER: ICD-10-CM

## 2022-03-10 DIAGNOSIS — Z80.3 FAMILY HISTORY OF MALIGNANT NEOPLASM OF BREAST: ICD-10-CM

## 2022-03-10 PROCEDURE — 999N000069 HC STATISTIC GENETIC COUNSELING, < 16 MIN: Mod: GT,95 | Performed by: GENETIC COUNSELOR, MS

## 2022-03-10 NOTE — Clinical Note
Please send copy of letter to patient with test results. Please enclose test results: Other Laboratory; [employer]; BRCANext-Expanded Panel, genetic testing (Laboratory Miscellaneous Order) [[id number]] (Order [id number])

## 2022-03-10 NOTE — PROGRESS NOTES
"3/10/2022    Kristi is a 67 year old who is being evaluated via a billable video visit.      Video-Visit Details    Type of service: Video Visit    Video Start Time: 07:59 am  Video End Time: 08:11 am    Time spent over video: 12 minutes.    Originating Location (pt. Location): Home    Distant Location (provider location): Cancer Risk Management Program    Platform used for Video Visit: [employer]    Video visit joined today by NEGIN Wild, RN, Doctor of Nursing Practice Student    Referring Provider: Morelia Crabtree DO    Presenting Information:  I spoke to Kristi over video today to discuss her genetic testing results. Testing was performed on a saliva sample collected by Kristi at her home. The BRCANext-Expanded Panel was ordered from [employer]. This testing was done because of Kristi's family history of cancer.    Genetic Testing Result: NEGATIVE  Kristi is negative for mutations in the 23 genes analyzed: KAZ, BARD1, BRCA1, BRCA2, BRIP1, CDH1, CHEK2, DICER1, MLH1, MSH2, MSH6, NBN, NF1, PALB2, PMS2, PTEN, RAD51C, RAD51D, RECQL, SMARCA4, STK11 and TP53 (sequencing and deletion/duplication); EPCAM (deletion/duplication only).     A copy of the test report can be found in the Laboratory tab, dated 2/7/22, and named \"LABORATORY MISCELLANEOUS ORDER\". The report is scanned in as a linked document.    Interpretation:  We discussed several different interpretations of this negative test result.    1. One explanation may be that there is a different gene or combination of genes and environment that are associated with the cancers in this family.  2. It is possible that her relatives have a mutation in one of these genes and she did not inherit it.  3. There is also a small possibility that there is a mutation in one of these genes, and the testing laboratory could not find it with their current testing methods.       Screening:  Based on this negative test result, it is important for Kristi and her relatives to refer back to " the family history for appropriate cancer screening.      Based on the personal and family history information she provided, Kristi does not meet current National Comprehensive Cancer Network (NCCN) guidelines for high risk breast screening based on the DANNY Risk Evaluator v8 model. As such, Kristi should continue with her routine breast imaging. In addition, Kristi should be receiving clinical breast exams by her physician.     Due to Kristi's family history of uterine and peritoneal cancer in her sisters, Kristi and other close female relatives remain at slightly increased risk for uterine and peritoneal/ovarian cancer. We discussed available uterine and ovarian cancer screening (pelvic exams, endometrial sampling, CA-125 blood tests, transvaginal ultrasounds) as well as the significant limitations of this screening. As such, this screening is not typically recommended. That being said, women in this family should discuss their family history, this screening, and the signs and symptoms of uterine and peritoneal/ovarian cancer with their primary OB/GYN provider, as they may have individualized recommendations.    Other population cancer screening options, such as those recommended by the American Cancer Society and the National Comprehensive Cancer Network (NCCN), are also appropriate for Kristi and her family. These screening recommendations may change if there are changes to Kristi's personal and/or family history of cancer. Final screening recommendations should be made by each individual's primary care provider.    Inheritance:  We reviewed the autosomal dominant inheritance of mutations in these genes. We discussed that Kristi cannot/did not pass on an identifiable mutation in these genes to her children based on this test result. Mutations in these genes do not skip generations.      Additional Testing Considerations:  Although Kristi's genetic testing result was negative, other relatives may still carry a gene mutation associated with  an increased risk for cancer. Genetic counseling is recommended for her sisters and paternal cousin to discuss genetic testing options. If any of these relatives do pursue genetic testing, Kristi is encouraged to contact me so that we may review the impact of their test results on her.    Summary:  We do not have an explanation for Kristi's family history of cancer. While no genetic changes were identified, Kristi may still be at risk for certain cancers due to family history, environmental factors, or other genetic causes not identified by this test. Because of that, it is important that she continue with cancer screening based on her personal and family history as discussed above.    Genetic testing is rapidly advancing, and new cancer susceptibility genes will most likely be identified in the future. Therefore, I encouraged Kristi to contact me annually or if there are changes in her personal or family history. This may change how we assess her cancer risk, screening, and the testing we would offer.    Plan:  1. A copy of the test results will be mailed to Kristi. A copy of her results was also released to her today via the online [employer] portal.  2. She plans to follow-up with her physicians.  3. She should contact me regularly, or sooner if her family history changes.    If Kristi has any further questions, I encouraged her to contact me at [phone].    Soniya Ordonez MS, [facility] [place]  Licensed, Certified Genetic Counselor  Office: [phone]  Email: [email or web address]

## 2022-03-10 NOTE — LETTER
Cancer Risk Management  Program [facility]  [facility]  [facility]  Mailing Address  Cancer Risk Management Program  [address]  [place] [address]    New patient appointments  [phone]  March 11, 2022    Kristi Aguero  [address] [facility] [address]    Dear Kristi,    It was a pleasure speaking with you over video for genetic counseling on 3/10/2022. Here is a copy of the progress note from our discussion. If you have any additional questions, please feel free to call.    Referring Provider: Morelia Crabtree,   Presenting Information:  I spoke to Kristi over video today to discuss her genetic testing results. Testing was performed on a saliva sample collected by Kristi at her home. The BRCANext-Expanded Panel was ordered from [employer]. This testing was done because of Kristi's family history of cancer.  Genetic Testing Result: NEGATIVE  Kristi is negative for mutations in the 23 genes analyzed: KAZ, BARD1, BRCA1, BRCA2, BRIP1, CDH1, CHEK2, DICER1, MLH1, MSH2, MSH6, NBN, NF1, PALB2, PMS2, PTEN, RAD51C, RAD51D, RECQL, SMARCA4, STK11 and TP53 (sequencing and deletion/duplication); EPCAM (deletion/duplication only).   Interpretation:  We discussed several different interpretations of this negative test result.    1. One explanation may be that there is a different gene or combination of genes and environment that are associated with the cancers in this family.  2. It is possible that her relatives have a mutation in one of these genes and she did not inherit it.  3. There is also a small possibility that there is a mutation in one of these genes, and the testing laboratory could not find it with their current testing methods.     Screening:  Based on this negative test result, it is important for  Kristi and her relatives to refer back to the family history for appropriate cancer screening.      Based on the personal and family history information she provided, Kristi does not meet current National Comprehensive Cancer Network (NCCN) guidelines for high risk breast screening based on the DANNY Risk Evaluator v8 model. As such, Kristi should continue with her routine breast imaging. In addition, Kristi should be receiving clinical breast exams by her physician.     Due to Kristi's family history of uterine and peritoneal cancer in her sisters, Kristi and other close female relatives remain at slightly increased risk for uterine and peritoneal/ovarian cancer. We discussed available uterine and ovarian cancer screening (pelvic exams, endometrial sampling, CA-125 blood tests, transvaginal ultrasounds) as well as the significant limitations of this screening. As such, this screening is not typically recommended. That being said, women in this family should discuss their family history, this screening, and the signs and symptoms of uterine and peritoneal/ovarian cancer with their primary OB/GYN provider, as they may have individualized recommendations.    Other population cancer screening options, such as those recommended by the American Cancer Society and the National Comprehensive Cancer Network (NCCN), are also appropriate for Kristi and her family. These screening recommendations may change if there are changes to Kristi's personal and/or family history of cancer. Final screening recommendations should be made by each individual's primary care provider.  Inheritance:  We reviewed the autosomal dominant inheritance of mutations in these genes. We discussed that Kristi cannot/did not pass on an identifiable mutation in these genes to her children based on this test result. Mutations in these genes do not skip generations.      Additional Testing Considerations:  Although Kristi's genetic testing result was negative, other relatives may still  carry a gene mutation associated with an increased risk for cancer. Genetic counseling is recommended for her sisters and paternal cousin to discuss genetic testing options. If any of these relatives do pursue genetic testing, Kristi is encouraged to contact me so that we may review the impact of their test results on her.    Summary:  We do not have an explanation for Kristi's family history of cancer. While no genetic changes were identified, Kristi may still be at risk for certain cancers due to family history, environmental factors, or other genetic causes not identified by this test. Because of that, it is important that she continue with cancer screening based on her personal and family history as discussed above.    Genetic testing is rapidly advancing, and new cancer susceptibility genes will most likely be identified in the future. Therefore, I encouraged Kristi to contact me annually or if there are changes in her personal or family history. This may change how we assess her cancer risk, screening, and the testing we would offer.    Plan:  1. A copy of the test results will be mailed to Kristi. A copy of her results was also released to her today via the online [employer] portal.  2. She plans to follow-up with her physicians.  3. She should contact me regularly, or sooner if her family history changes.    If Kristi has any further questions, I encouraged her to contact me at [phone].    Soniya Ordonez MS, [facility]  Licensed, Certified Genetic Counselor  Office: [phone]  Email: [email or web address]

## 2022-10-07 NOTE — PROGRESS NOTES
Kristi is a 67 year old  female who presents for annual exam.     Besides routine health maintenance, she has no other health concerns today .    Do you have a Health Care Directive?: Yes, advance care planning is on file.    Fall risk:   Fall Risk Assessment completed per order.    HPI:  [facility].  Follows regularly. WIll go back for some left back pain that goes into glut. Been going on about a month, doing stretching    No vb/discharge.     Not SA. Not in a relationship      GYNECOLOGIC HISTORY:  No LMP recorded. Patient is postmenopausal..   reports that she has quit smoking. She has never used smokeless tobacco.    Patient is not sexually active.  STD testing offered?  Declined  Last PHQ-9 score on record=   PHQ-9 SCORE 10/7/2021   PHQ-9 Total Score 0     Last GAD7 score on record=   ALEC-7 SCORE 6/3/2019 8/10/2020 10/7/2021   Total Score 0 0 0       HEALTH MAINTENANCE:  Cholesterol: (  Cholesterol   Date Value Ref Range Status   10/26/2011 221 (A) 115 - 199 mg/dL Final      Last Mammo: One year ago, Result: Normal, Next Mammo: Today   Pap: (  Lab Results   Component Value Date    PAP NIL 2019    PAP NIL 2018    PAP NIL 2017    )  DEXA:  10/13/2021  Colonoscopy:  2017, Result:  Normal, Next Colonoscopy:      Health maintenance updated:  no    HISTORY:  OB History    Para Term  AB Living   4 3 2 1 1 3   SAB IAB Ectopic Multiple Live Births   1 0 0 0 3      # Outcome Date GA Lbr Alexx/2nd Weight Sex Delivery Anes PTL Lv   4   33w0d  1.984 kg (4 lb 6 oz) F    KAMILLE      Name: Verito   3 Term  39w0d  2.807 kg (6 lb 3 oz) M    KAMILLE      Name: Alden   2 Term  37w0d  2.551 kg (5 lb 10 oz) M    KAMILLE      Name: Surya   1 SAB              Patient Active Problem List   Diagnosis     Essential hypertension     Cerebral infarction (H)     Hypotension, postural     Cervical high risk HPV (human papillomavirus) test  "positive     Past Surgical History:   Procedure Laterality Date     NO HISTORY OF SURGERY        Social History     Tobacco Use     Smoking status: Former     Smokeless tobacco: Never     Tobacco comments:     Quit 40+ years ago   Substance Use Topics     Alcohol use: Yes     Alcohol/week: 0.0 standard drinks      Problem (# of Occurrences) Relation (Name,Age of Onset)    Anxiety Disorder (1) Daughter    Diabetes (1) Mother    Hypertension (1) Mother    Osteoporosis (1) Father    Ovarian Cancer (1) Sister: Peritoneal    Endometrial Cancer (1) Sister (50)    Colon Cancer (1) Paternal Grandfather (60)    Esophageal Cancer (1) Mother            Current Outpatient Medications   Medication Sig     ASPIRIN PO Take  by mouth.       atenolol (TENORMIN) 50 MG tablet Take 0.5 tablets by mouth daily.     Cyanocobalamin (VITAMIN B-12 PO)      METOPROLOL SUCCINATE PO      VITAMIN D, CHOLECALCIFEROL, PO Take by mouth daily     No current facility-administered medications for this visit.       No Known Allergies    Past medical, surgical, social and family history were reviewed and updated in EPIC.    ROS:   12 point review of systems negative other than symptoms noted below or in the HPI.      EXAM:  /68   Ht 1.61 m (5' 3.39\")   Wt 55.8 kg (123 lb)   BMI 21.52 kg/m     BMI: Body mass index is 21.52 kg/m .    EXAM:  Constitutional: Appearance: Well nourished, well developed alert, in no acute distress  Neck:  Lymph Nodes:  No lymphadenopathy present    Thyroid:  Gland size normal, nontender, no nodules or masses present  on palpation  Chest:  Respiratory Effort:  Breathing unlabored  Cardiovascular:Heart    Auscultation:  Regular rate, normal rhythm, no murmurs present  Breasts: Inspection of Breasts:  No lymphadenopathy present., Palpation of Breasts and Axillae:  No masses present on palpation, no breast tenderness., Axillary Lymph Nodes:  No lymphadenopathy present. and No nodularity, asymmetry or nipple discharge " bilaterally.  Gastrointestinal:  Abdominal Examination:  Abdomen nontender to palpation, tone normal without     rigidity or guarding, no masses present, umbilicus without lesions    Liver and speen:  No hepatomegaly present, liver nontender to palpation    Hernias:  No hernias present  Lymphatic: Lymph Nodes:  No other lymphadenopathy present  Skin:  General Inspection:  No rashes present, no lesions present, no areas of  discoloration.    Genitalia and Groin:  No rashes present, no lesions present, no areas of  discoloration, no masses present  Neurologic/Psychiatric:    Mental Status:  Oriented X3     Pelvic Exam:  External Genitalia:     Normal appearance for age, no discharge present, no tenderness present, no inflammatory lesions present, color normal  Vagina:     Normal vaginal vault without central or paravaginal defects, no discharge present, no inflammatory lesions present, no masses present  Bladder:     Nontender to palpation  Urethra:   Urethral Body:  Urethra palpation normal, urethra structural support normal   Urethral Meatus:  No erythema or lesions present  Cervix:     Appearance healthy, no lesions present, nontender to palpation, no bleeding present  Uterus:     Uterus: firm, normal sized and nontender, midplane in position.   Adnexa:     No adnexal tenderness present, no adnexal masses present  Perineum:     Perineum within normal limits, no evidence of trauma, no rashes or skin lesions present  Anus:     Anus within normal limits, no hemorrhoids present  Inguinal Lymph Nodes:     No lymphadenopathy present  Pubic Hair:     Normal pubic hair distribution for age  Genitalia and Groin:     No rashes present, no lesions present, no areas of discoloration, no masses present      COUNSELING:   Reviewed preventive health counseling, as reflected in patient instructions       Osteoporosis prevention/bone health    BMI:  Body mass index is 21.52 kg/m .     reports that she has quit smoking. She has  never used smokeless tobacco.      ASSESSMENT:  67 year old female with satisfactory annual exam.    ICD-10-CM    1. Screening for cervical cancer  Z12.4 Pap thin layer screen with HPV - recommended age 30 - 65 years      2. Encounter for gynecological examination without abnormal finding  Z01.419           PLAN:  -Pap/hpv obtained for cervical cancer screening. Reviewed guidelines-pap q 3yrs until age 30 when co-testing q 5 years. Discussed guidelines for discontinuation for pap at age 65. Will manage per guidelines and eval plan based on results  -Breast self awareness discussed. Due for mammogram.  -Osteoporosis prevention discussed.  -PCP manages labs  -PMB precautions  -Return one year for next annual exam          Morelia Teran Masters, DO

## 2022-10-10 ENCOUNTER — OFFICE VISIT (OUTPATIENT)
Dept: OBGYN | Facility: CLINIC | Age: 67
End: 2022-10-10
Payer: COMMERCIAL

## 2022-10-10 ENCOUNTER — ANCILLARY PROCEDURE (OUTPATIENT)
Dept: MAMMOGRAPHY | Facility: CLINIC | Age: 67
End: 2022-10-10
Payer: COMMERCIAL

## 2022-10-10 VITALS
HEIGHT: 63 IN | DIASTOLIC BLOOD PRESSURE: 68 MMHG | SYSTOLIC BLOOD PRESSURE: 122 MMHG | WEIGHT: 123 LBS | BODY MASS INDEX: 21.79 KG/M2

## 2022-10-10 DIAGNOSIS — Z12.4 SCREENING FOR CERVICAL CANCER: ICD-10-CM

## 2022-10-10 DIAGNOSIS — Z01.419 ENCOUNTER FOR GYNECOLOGICAL EXAMINATION WITHOUT ABNORMAL FINDING: Primary | ICD-10-CM

## 2022-10-10 DIAGNOSIS — Z12.31 VISIT FOR SCREENING MAMMOGRAM: ICD-10-CM

## 2022-10-10 PROCEDURE — 87624 HPV HI-RISK TYP POOLED RSLT: CPT | Performed by: OBSTETRICS & GYNECOLOGY

## 2022-10-10 PROCEDURE — 77063 BREAST TOMOSYNTHESIS BI: CPT | Mod: TC | Performed by: RADIOLOGY

## 2022-10-10 PROCEDURE — G0145 SCR C/V CYTO,THINLAYER,RESCR: HCPCS | Performed by: OBSTETRICS & GYNECOLOGY

## 2022-10-10 PROCEDURE — 77067 SCR MAMMO BI INCL CAD: CPT | Mod: TC | Performed by: RADIOLOGY

## 2022-10-10 PROCEDURE — 99397 PER PM REEVAL EST PAT 65+ YR: CPT | Performed by: OBSTETRICS & GYNECOLOGY

## 2022-10-10 ASSESSMENT — ANXIETY QUESTIONNAIRES
7. FEELING AFRAID AS IF SOMETHING AWFUL MIGHT HAPPEN: NOT AT ALL
GAD7 TOTAL SCORE: 0
2. NOT BEING ABLE TO STOP OR CONTROL WORRYING: NOT AT ALL
GAD7 TOTAL SCORE: 0
6. BECOMING EASILY ANNOYED OR IRRITABLE: NOT AT ALL
1. FEELING NERVOUS, ANXIOUS, OR ON EDGE: NOT AT ALL
3. WORRYING TOO MUCH ABOUT DIFFERENT THINGS: NOT AT ALL
5. BEING SO RESTLESS THAT IT IS HARD TO SIT STILL: NOT AT ALL
IF YOU CHECKED OFF ANY PROBLEMS ON THIS QUESTIONNAIRE, HOW DIFFICULT HAVE THESE PROBLEMS MADE IT FOR YOU TO DO YOUR WORK, TAKE CARE OF THINGS AT HOME, OR GET ALONG WITH OTHER PEOPLE: NOT DIFFICULT AT ALL

## 2022-10-10 ASSESSMENT — PATIENT HEALTH QUESTIONNAIRE - PHQ9
SUM OF ALL RESPONSES TO PHQ QUESTIONS 1-9: 0
5. POOR APPETITE OR OVEREATING: NOT AT ALL

## 2022-10-10 NOTE — PATIENT INSTRUCTIONS
-Daily total calcium intake (between food/supplements) should be 1200mg which equates to 5 servings calcium containing food per day; VItamin D 1000IU.   Foods rich in calcium are: milk, cheese, yogurt, seafood, sardines and canned salmon, leafy green vegetables such as mark greens, spinach and kale, beans and lentils, almonds, seeds (poppy, sesame, celery, ranjana), rhubarb, dried fruit such as figs, whey protein, tofu and edamame, amaranth, other foods with added calcium such as orange juice and some cereals.   If adequate amount not taken in diet, then a supplement may be needed.     -I also recommend increasing your dietary fiber by starting Metamucil (powder mixed in glass of water) once to twice daily

## 2022-10-12 LAB
BKR LAB AP GYN ADEQUACY: NORMAL
BKR LAB AP GYN INTERPRETATION: NORMAL
BKR LAB AP HPV REFLEX: NORMAL
BKR LAB AP PREVIOUS ABNL DX: NORMAL
BKR LAB AP PREVIOUS ABNORMAL: NORMAL
PATH REPORT.COMMENTS IMP SPEC: NORMAL
PATH REPORT.COMMENTS IMP SPEC: NORMAL
PATH REPORT.RELEVANT HX SPEC: NORMAL

## 2022-10-13 LAB
HUMAN PAPILLOMA VIRUS 16 DNA: NEGATIVE
HUMAN PAPILLOMA VIRUS 18 DNA: NEGATIVE
HUMAN PAPILLOMA VIRUS FINAL DIAGNOSIS: ABNORMAL
HUMAN PAPILLOMA VIRUS OTHER HR: POSITIVE

## 2022-10-14 ENCOUNTER — MYC MEDICAL ADVICE (OUTPATIENT)
Dept: OBGYN | Facility: CLINIC | Age: 67
End: 2022-10-14

## 2022-10-14 ENCOUNTER — PATIENT OUTREACH (OUTPATIENT)
Dept: OBGYN | Facility: CLINIC | Age: 67
End: 2022-10-14

## 2022-10-14 NOTE — TELEPHONE ENCOUNTER
Routing pt mychart message to provider to advise.    -CC supplements-nothing found in Uptodate    Has had NIL/positive other HR HPV for 4 pap/hpv checks  -plan for cotest in 1 year    Regla Wooten RN on 10/14/2022 at 2:55 PM

## 2022-10-14 NOTE — TELEPHONE ENCOUNTER
10/10/22 NIL pap, + HR HPV (not 16/18). Plan 1 year cotest   [facility] Hospital Medicine  Progress Note    Patient Name: Sunita Zimmer  MRN: [id number]  Patient Class: IP- Inpatient   Admission Date: 5/3/2018  Length of Stay: 1 days  Attending Physician: Stanley Prater MD  Primary Care Provider: Von Mccullough MD    Hospital Medicine Team: Networked reference to record PCT  Andrade Roman MD    Subjective:     Principal Problem:<principal problem not specified>    HPI:  Mrs. Zimmer is 81-year-old lady with HTN, CAD, PVD (s/p bilateral subclavian and iliofemoral PCI), polycythemia vera with DVT on chronic warfarin, COPD, polycystic kidney disease, CKD stage 3 who was admitted for caudia equina syndrome. Hospital Medicine consulted for preop clearance for urgent laminectomy. Patient endorses dyspnea with minimal exertion (unable to complete simple household chores). She endorses a 90 pack year tobacco history and currently smokes 1PPD. Patient denies any recent history of CP (at rest or exertion), palpitations, orthopnea, PND.     Hospital Course:  No notes on file    Interval History: Patient POD 1 s/p laminectomy for cauda equina syndrome. Patient doing well. She denies any fevers, chills, CP, SOB, or cough.     Review of Systems   Constitutional: Negative for chills and fever.   HENT: Negative for congestion and sore throat.    Eyes: Negative for photophobia and visual disturbance.   Respiratory: Negative for cough, chest tightness, shortness of breath and wheezing.    Cardiovascular: Negative for chest pain, palpitations and leg swelling.   Gastrointestinal: Negative for abdominal pain, diarrhea, nausea and vomiting.   Genitourinary: Negative for dysuria and hematuria.   Musculoskeletal: Negative for arthralgias and myalgias.   Skin: Negative for color change and rash.   Neurological: Negative for dizziness, syncope, light-headedness and headaches.     Objective:     Vital Signs (Most Recent):  Temp: 98.8 °F (37.1 °C) (05/05/18 1224)  Pulse:  (!) 117 (05/05/18 1224)  Resp: 16 (05/05/18 1224)  BP: (!) 135/97 (05/05/18 1224)  SpO2: 98 % (05/05/18 1224) Vital Signs (24h Range):  Temp:  [96.5 °F (35.8 °C)-98.8 °F (37.1 °C)] 98.8 °F (37.1 °C)  Pulse:  [] 117  Resp:  [15-20] 16  SpO2:  [93 %-100 %] 98 %  BP: ()/() 135/97     Weight: 49.7 kg (109 lb 9.1 oz)  Body mass index is 20.04 kg/m².    Intake/Output Summary (Last 24 hours) at 05/05/18 1310  Last data filed at 05/05/18 0708   Gross per 24 hour   Intake             2668 ml   Output             1670 ml   Net              998 ml      Physical Exam   Constitutional: She is oriented to person, place, and time. No distress.   Frail appearing elderly lady.    HENT:   Head: Normocephalic and atraumatic.   Mouth/Throat: Oropharynx is clear and moist.   Eyes: Conjunctivae and EOM are normal. Pupils are equal, round, and reactive to light.   Neck: Normal range of motion. Neck supple. No JVD present.   Cardiovascular: Normal rate, regular rhythm and intact distal pulses.    Murmur (Harsh sytolic ejection murmur (III/VI) radiating to carotids. ) heard.  Pulmonary/Chest: Effort normal. No respiratory distress. She has no wheezes. She has no rales. She exhibits no tenderness.   Abdominal: Soft. Bowel sounds are normal. She exhibits no distension. There is no tenderness.   Musculoskeletal: Normal range of motion. She exhibits no edema.   Neurological: She is alert and oriented to person, place, and time.   Skin: Skin is warm and dry. Capillary refill takes less than 2 seconds. She is not diaphoretic. No erythema.       Significant Labs:   Recent Results (from the past 24 hour(s))   CBC auto differential    Collection Time: 05/04/18  5:18 PM   Result Value Ref Range    WBC 6.94 3.90 - 12.70 K/uL    RBC 3.15 (L) 4.00 - 5.40 M/uL    Hemoglobin 9.1 (L) 12.0 - 16.0 g/dL    Hematocrit 27.4 (L) 37.0 - 48.5 %    MCV 87 82 - 98 fL    MCH 28.9 27.0 - 31.0 pg    MCHC 33.2 32.0 - 36.0 g/dL    RDW 13.0 11.5 - 14.5 %     Platelets 201 150 - 350 K/uL    MPV 8.9 (L) 9.2 - 12.9 fL    Immature Granulocytes 0.6 (H) 0.0 - 0.5 %    Gran # (ANC) 5.0 1.8 - 7.7 K/uL    Immature Grans (Abs) 0.04 0.00 - 0.04 K/uL    Lymph # 1.2 1.0 - 4.8 K/uL    Mono # 0.7 0.3 - 1.0 K/uL    Eos # 0.0 0.0 - 0.5 K/uL    Baso # 0.03 0.00 - 0.20 K/uL    nRBC 0 0 /100 WBC    Gran% 71.3 38.0 - 73.0 %    Lymph% 17.7 (L) 18.0 - 48.0 %    Mono% 9.4 4.0 - 15.0 %    Eosinophil% 0.6 0.0 - 8.0 %    Basophil% 0.4 0.0 - 1.9 %    Differential Method Automated    Basic metabolic panel    Collection Time: 05/04/18  5:18 PM   Result Value Ref Range    Sodium 142 136 - 145 mmol/L    Potassium 3.9 3.5 - 5.1 mmol/L    Chloride 109 95 - 110 mmol/L    CO2 24 23 - 29 mmol/L    Glucose 94 70 - 110 mg/dL    BUN, Bld 32 (H) 8 - 23 mg/dL    Creatinine 1.0 0.5 - 1.4 mg/dL    Calcium 8.1 (L) 8.7 - 10.5 mg/dL    Anion Gap 9 8 - 16 mmol/L    eGFR if African American >60.0 >60 mL/min/1.73 m^2    eGFR if non  53.7 (A) >60 mL/min/1.73 m^2   CBC auto differential    Collection Time: 05/05/18  4:15 AM   Result Value Ref Range    WBC 8.54 3.90 - 12.70 K/uL    RBC 3.77 (L) 4.00 - 5.40 M/uL    Hemoglobin 10.6 (L) 12.0 - 16.0 g/dL    Hematocrit 33.5 (L) 37.0 - 48.5 %    MCV 89 82 - 98 fL    MCH 28.1 27.0 - 31.0 pg    MCHC 31.6 (L) 32.0 - 36.0 g/dL    RDW 13.1 11.5 - 14.5 %    Platelets 217 150 - 350 K/uL    MPV 9.3 9.2 - 12.9 fL    Immature Granulocytes 0.7 (H) 0.0 - 0.5 %    Gran # (ANC) 7.1 1.8 - 7.7 K/uL    Immature Grans (Abs) 0.06 (H) 0.00 - 0.04 K/uL    Lymph # 0.8 (L) 1.0 - 4.8 K/uL    Mono # 0.6 0.3 - 1.0 K/uL    Eos # 0.0 0.0 - 0.5 K/uL    Baso # 0.01 0.00 - 0.20 K/uL    nRBC 0 0 /100 WBC    Gran% 82.6 (H) 38.0 - 73.0 %    Lymph% 9.3 (L) 18.0 - 48.0 %    Mono% 7.3 4.0 - 15.0 %    Eosinophil% 0.0 0.0 - 8.0 %    Basophil% 0.1 0.0 - 1.9 %    Differential Method Automated    Basic metabolic panel    Collection Time: 05/05/18  4:15 AM   Result Value Ref Range    Sodium 144 136 -  145 mmol/L    Potassium 4.1 3.5 - 5.1 mmol/L    Chloride 110 95 - 110 mmol/L    CO2 22 (L) 23 - 29 mmol/L    Glucose 85 70 - 110 mg/dL    BUN, Bld 28 (H) 8 - 23 mg/dL    Creatinine 0.9 0.5 - 1.4 mg/dL    Calcium 8.5 (L) 8.7 - 10.5 mg/dL    Anion Gap 12 8 - 16 mmol/L    eGFR if African American >60.0 >60 mL/min/1.73 m^2    eGFR if non African American >60.0 >60 mL/min/1.73 m^2   Protime-INR    Collection Time: 05/05/18  8:39 AM   Result Value Ref Range    Prothrombin Time 10.2 9.0 - 12.5 sec    INR 1.0 0.8 - 1.2             Assessment/Plan:      COPD (chronic obstructive pulmonary disease)    - Patient stable post-operatively from a pulmonary standpoint. No wheezing or SOB.  - Will resume home COPD inhalers.  - Supplemental oxygen nasal cannula to keep SpO2 88-92%  - Duo-nebs Q4 PRN for wheezing/SOB.   - Incentive spirometry             Personal history of DVT (deep vein thrombosis)    Polycythemia  - Patient with polycythemia vera and history of DVT on chronic coumadin.  - Recommend resuming home warfarin when deemed appropriate by neurosurgery.            VTE Risk Mitigation     None              Andrade Roman MD  Department of Hospital Medicine   [facility]                    05/05/2018                             STAFF PHYSICIAN NOTE                                   Attending Attestation for Rounds with Resident  I have reviewed and concur with the resident's history, physical, assessment, and plan.  I have personally interviewed and examined the patient at bedside and agree with the resident's findings.                                  ________________________________________                                     REASON FOR ADMISSION:     Patient is 81 y.o.female    Body mass index is 20.04 kg/m².,

## 2022-10-18 NOTE — TELEPHONE ENCOUNTER
There is not sufficient evidence/studies to make a recommendation for or against use of this supplement. Currently, it is not part of standard treatment/therapy for this indication.     As with many supplements, I would make sure your physician who prescribes the heart medications is aware of new supplements to avoid interactions.    Morelia Teran Masters, DO

## 2022-10-22 ENCOUNTER — HEALTH MAINTENANCE LETTER (OUTPATIENT)
Age: 67
End: 2022-10-22

## 2023-06-16 ENCOUNTER — HOSPITAL ENCOUNTER (EMERGENCY)
Facility: CLINIC | Age: 68
Discharge: HOME | End: 2023-06-16
Attending: EMERGENCY MEDICINE | Admitting: EMERGENCY MEDICINE
Payer: COMMERCIAL

## 2023-06-16 VITALS
RESPIRATION RATE: 16 BRPM | TEMPERATURE: 98.7 F | SYSTOLIC BLOOD PRESSURE: 163 MMHG | WEIGHT: 125 LBS | HEART RATE: 83 BPM | OXYGEN SATURATION: 98 % | HEIGHT: 63 IN | DIASTOLIC BLOOD PRESSURE: 104 MMHG | BODY MASS INDEX: 22.15 KG/M2

## 2023-06-16 DIAGNOSIS — I10 HYPERTENSION, UNSPECIFIED TYPE: ICD-10-CM

## 2023-06-16 DIAGNOSIS — R42 LIGHTHEADEDNESS: ICD-10-CM

## 2023-06-16 LAB
ALBUMIN SERPL BCG-MCNC: 4.5 G/DL (ref 3.5–5.2)
ALP SERPL-CCNC: 62 U/L (ref 35–104)
ALT SERPL W P-5'-P-CCNC: 14 U/L (ref 0–50)
ANION GAP SERPL CALCULATED.3IONS-SCNC: 13 MMOL/L (ref 7–15)
AST SERPL W P-5'-P-CCNC: 21 U/L (ref 0–45)
ATRIAL RATE - MUSE: 65 BPM
BASOPHILS # BLD AUTO: 0.1 10E3/UL (ref 0–0.2)
BASOPHILS NFR BLD AUTO: 1 %
BILIRUB SERPL-MCNC: 0.4 MG/DL
BUN SERPL-MCNC: 16.2 MG/DL (ref 8–23)
CALCIUM SERPL-MCNC: 9.6 MG/DL (ref 8.8–10.2)
CHLORIDE SERPL-SCNC: 98 MMOL/L (ref 98–107)
CREAT SERPL-MCNC: 0.74 MG/DL (ref 0.51–0.95)
DEPRECATED HCO3 PLAS-SCNC: 25 MMOL/L (ref 22–29)
DIASTOLIC BLOOD PRESSURE - MUSE: NORMAL MMHG
EOSINOPHIL # BLD AUTO: 0.1 10E3/UL (ref 0–0.7)
EOSINOPHIL NFR BLD AUTO: 2 %
ERYTHROCYTE [DISTWIDTH] IN BLOOD BY AUTOMATED COUNT: 12.7 % (ref 10–15)
GFR SERPL CREATININE-BSD FRML MDRD: 89 ML/MIN/1.73M2
GLUCOSE SERPL-MCNC: 85 MG/DL (ref 70–99)
HCT VFR BLD AUTO: 39.1 % (ref 35–47)
HGB BLD-MCNC: 12.7 G/DL (ref 11.7–15.7)
IMM GRANULOCYTES # BLD: 0 10E3/UL
IMM GRANULOCYTES NFR BLD: 0 %
INTERPRETATION ECG - MUSE: NORMAL
LYMPHOCYTES # BLD AUTO: 1.5 10E3/UL (ref 0.8–5.3)
LYMPHOCYTES NFR BLD AUTO: 35 %
MAGNESIUM SERPL-MCNC: 2.3 MG/DL (ref 1.7–2.3)
MCH RBC QN AUTO: 30.7 PG (ref 26.5–33)
MCHC RBC AUTO-ENTMCNC: 32.5 G/DL (ref 31.5–36.5)
MCV RBC AUTO: 94 FL (ref 78–100)
MONOCYTES # BLD AUTO: 0.4 10E3/UL (ref 0–1.3)
MONOCYTES NFR BLD AUTO: 8 %
NEUTROPHILS # BLD AUTO: 2.4 10E3/UL (ref 1.6–8.3)
NEUTROPHILS NFR BLD AUTO: 54 %
NRBC # BLD AUTO: 0 10E3/UL
NRBC BLD AUTO-RTO: 0 /100
P AXIS - MUSE: 79 DEGREES
PLATELET # BLD AUTO: 246 10E3/UL (ref 150–450)
POTASSIUM SERPL-SCNC: 3.9 MMOL/L (ref 3.4–5.3)
PR INTERVAL - MUSE: 146 MS
PROT SERPL-MCNC: 6.9 G/DL (ref 6.4–8.3)
QRS DURATION - MUSE: 78 MS
QT - MUSE: 386 MS
QTC - MUSE: 401 MS
R AXIS - MUSE: 58 DEGREES
RBC # BLD AUTO: 4.14 10E6/UL (ref 3.8–5.2)
SODIUM SERPL-SCNC: 136 MMOL/L (ref 136–145)
SYSTOLIC BLOOD PRESSURE - MUSE: NORMAL MMHG
T AXIS - MUSE: 67 DEGREES
TSH SERPL DL<=0.005 MIU/L-ACNC: 3.04 UIU/ML (ref 0.3–4.2)
VENTRICULAR RATE- MUSE: 65 BPM
WBC # BLD AUTO: 4.4 10E3/UL (ref 4–11)

## 2023-06-16 PROCEDURE — 83735 ASSAY OF MAGNESIUM: CPT | Performed by: EMERGENCY MEDICINE

## 2023-06-16 PROCEDURE — 84443 ASSAY THYROID STIM HORMONE: CPT | Performed by: EMERGENCY MEDICINE

## 2023-06-16 PROCEDURE — 85025 COMPLETE CBC W/AUTO DIFF WBC: CPT | Performed by: EMERGENCY MEDICINE

## 2023-06-16 PROCEDURE — 36415 COLL VENOUS BLD VENIPUNCTURE: CPT | Performed by: EMERGENCY MEDICINE

## 2023-06-16 PROCEDURE — 80053 COMPREHEN METABOLIC PANEL: CPT | Performed by: EMERGENCY MEDICINE

## 2023-06-16 PROCEDURE — 93005 ELECTROCARDIOGRAM TRACING: CPT

## 2023-06-16 PROCEDURE — 99284 EMERGENCY DEPT VISIT MOD MDM: CPT

## 2023-06-16 NOTE — ED NOTES
Rapid Assessment Note    History:   Kristi Aguero is a 68 year old female who presents with episodes of feeling faint as well as persistent lightheadedness. Her episodes of lightheadedness are random and unprovoked. She states that she has had labs completed at [facility] with nothing abnormal noted by her doctor. Denies any syncopal episodes. No chest pain, chest pressure, fevers, cold or flu symptoms, leg swelling, or headache. She denies any changes to her medications.    Exam:   General:  Alert, interactive  Cardiovascular:  Well perfused  Lungs:  No respiratory distress, no accessory muscle use  Neuro:  Moving all 4 extremities  Skin:  Warm, dry  Psych:  Normal affect      Plan of Care:   I evaluated the patient and developed an initial plan of care. I discussed this plan and explained that I, or one of my partners, would be returning to complete the evaluation.     I, Kevin Hired, am serving as a scribe to document services personally performed by Alden Sahni MD, based on my observations and the provider's statements to me.    11/5/2018  [employer]    Portions of this medical record were completed by a scribe. UPON MY REVIEW AND AUTHENTICATION BY ELECTRONIC SIGNATURE, this confirms (a) I performed the applicable clinical services, and (b) the record is accurate.      Alden Sahni MD  06/16/23 [id number]

## 2023-06-16 NOTE — ED TRIAGE NOTES
Pt stating she has had episodes off and on over the last month pretty frequently . No other symptoms noted. Pt is quite hypertensive in triage and stated she is on meds and that she is normally in the 120's n them . Denies vision changes or headaches.      Triage Assessment     Row Name 06/16/23 [id number]       Triage Assessment (Adult)    Airway WDL WDL       Respiratory WDL    Respiratory WDL WDL       Skin Circulation/Temperature WDL    Skin Circulation/Temperature WDL WDL       Cardiac WDL    Cardiac WDL WDL       Peripheral/Neurovascular WDL    Peripheral Neurovascular WDL WDL       Cognitive/Neuro/Behavioral WDL    Cognitive/Neuro/Behavioral WDL WDL

## 2023-06-17 NOTE — ED PROVIDER NOTES
History     Chief Complaint:  light headed  and Hypertension       HPI   Kristi Aguero is a 68 year old female with a history of hypertension and CVA who presents to the ED for evaluation of lightheadedness.  The patient states that over the past month she has had intermittent episodes of lightheadedness that are brief and do not interfere with activities.  Today, however, the patient had an episode of lightheadedness that lasted for approximately 15 seconds where she had to steady herself and was concerned that she may pass out.  She did not have any prodromal symptoms with this including vision changes or sweats and did not have any episode of syncope.  The patient takes lisinopril and metoprolol for hypertension and has been taking his medications regularly without any recent changes.  She denies fever, chills, headache, vision changes, upper respiratory symptoms, chest pain, shortness of breath, abdominal pain, nausea, vomiting, diarrhea, numbness, tingling, weakness, or recent head injury.      Independent Historian:   None - Patient Only    Review of External Notes:   6/1/23: Most recent annual exam, discussed balance difficulties at this time and recommended [facility]      Medications:    ASPIRIN PO  Lisinopril 20mg  Cyanocobalamin (VITAMIN B-12 PO)  METOPROLOL SUCCINATE PO  VITAMIN D, CHOLECALCIFEROL, PO    Past Medical History:    Past Medical History:   Diagnosis Date     Cervical high risk HPV (human papillomavirus) test positive 05/25/2017     CVA (cerebral infarction)      History of colposcopy 06/15/2018     Hypotension, postural      Unspecified cerebral artery occlusion with cerebral infarction      Unspecified essential hypertension 1999       Past Surgical History:    Past Surgical History:   Procedure Laterality Date     NO HISTORY OF SURGERY          Physical Exam     Patient Vitals for the past 24 hrs:   BP Temp Temp src Pulse Resp SpO2 Height Weight  "  06/16/23 2230 (!) 163/104 -- -- 83 -- 98 % -- --   06/16/23 2153 (!) 160/80 -- -- 68 16 98 % -- --   06/16/23 1822 (!) 183/99 -- -- -- -- -- -- --   06/16/23 1818 (!) 197/116 98.7  F (37.1  C) Temporal 78 16 98 % 1.6 m (5' 3\") 56.7 kg (125 lb)        Physical Exam  General: Alert, appears well-developed and well-nourished. Cooperative.     In minimal distress  HEENT:  Head:  Atraumatic  Ears:  External ears are normal  Mouth/Throat:  Oropharynx is without erythema or exudate and mucous membranes are moist.   Eyes:   Conjunctivae normal and EOM are normal. No scleral icterus.    Pupils are equal, round, and reactive to light.   Neck:   Normal range of motion. Neck supple.  CV:  Normal rate, regular rhythm, normal heart sounds and radial and dorsalis pedis pulses are 2+ and symmetric.  No murmur.  Resp:  Breath sounds are clear bilaterally    Non-labored, no retractions or accessory muscle use  GI:  Abdomen is soft, no distension, no tenderness. No rebound or guarding.  MS:  Normal range of motion. No edema.    Normal strength in all 4 extremities.     Back atraumatic.  Skin:  Warm and dry.  No rash or lesions noted.  Neuro: Alert. Normal strength.  Sensation intact in all 4 extremities. GCS: 15    Cranial nerves 2-12 intact. Negative Rhomberg. Normal finger nose finger.   Normal heel to shin. Negative straight leg raise.  Psych:  Normal mood and affect.    Emergency Department Course     ECG results from 06/16/23   EKG 12-lead, tracing only     Value    Systolic Blood Pressure     Diastolic Blood Pressure     Ventricular Rate 65    Atrial Rate 65    KY Interval 146    QRS Duration 78        QTc 401    P Axis 79    R AXIS 58    T Axis 67    Interpretation ECG      Sinus rhythm  Normal ECG  When compared with ECG of 26-SEP-2019 13:33,  No significant change was found  Confirmed by GENERATED REPORT, COMPUTER ([id number]),  Aasen, Bradley ([id number]) on 6/16/2023 10:29:20 PM       Laboratory:  Labs Ordered and " Resulted from Time of ED Arrival to Time of ED Departure   COMPREHENSIVE METABOLIC PANEL - Normal       Result Value    Sodium 136      Potassium 3.9      Chloride 98      Carbon Dioxide (CO2) 25      Anion Gap 13      Urea Nitrogen 16.2      Creatinine 0.74      Calcium 9.6      Glucose 85      Alkaline Phosphatase 62      AST 21      ALT 14      Protein Total 6.9      Albumin 4.5      Bilirubin Total 0.4      GFR Estimate 89     TSH WITH FREE T4 REFLEX - Normal    TSH 3.04     MAGNESIUM - Normal    Magnesium 2.3     CBC WITH PLATELETS AND DIFFERENTIAL    WBC Count 4.4      RBC Count 4.14      Hemoglobin 12.7      Hematocrit 39.1      MCV 94      MCH 30.7      MCHC 32.5      RDW 12.7      Platelet Count 246      % Neutrophils 54      % Lymphocytes 35      % Monocytes 8      % Eosinophils 2      % Basophils 1      % Immature Granulocytes 0      NRBCs per 100 WBC 0      Absolute Neutrophils 2.4      Absolute Lymphocytes 1.5      Absolute Monocytes 0.4      Absolute Eosinophils 0.1      Absolute Basophils 0.1      Absolute Immature Granulocytes 0.0      Absolute NRBCs 0.0          Procedures   None    Emergency Department Course & Assessments:    Assessments:  2128: Initial evaluation and assessment.  2150: Recheck, patient in agreement with plan for discharge with close primary care follow-up.  Return precautions advised.    Independent Interpretation (X-rays, CTs, rhythm strip):  None    Consultations/Discussion of Management or Tests:  None        Social Determinants of Health affecting care:   None    Disposition:  The patient was discharged to home.     Impression & Plan    CMS Diagnoses: None    Medical Decision Making:  Kristi Aguero is a 68 year old female with a history of hypertension and CVA who presents to the ED for evaluation of lightheadedness.  On exam, the patient has normal rate and rhythm with clear breath sounds and no focal neurologic findings.  Initially, patient's blood pressure was noted to be  197/116 which gradually decreased throughout the ED course to approximately 160/90.  She denies any concerning symptoms including severe headache, vision changes, or dizziness.  Blood work was obtained including CBC, CMP, TSH, and magnesium which are all within normal limits.  EKG shows normal sinus rhythm without evidence for ischemia.  Head CT was considered, however, in the setting of improved lightheadedness and a normal neurologic exam this was not indicated today.  With these reassuring findings without worrisome symptoms, we felt the patient would be appropriate for discharge home with close primary care provider follow-up.  We recommended that the patient increase her lisinopril to 40 mg daily from 20 mg daily and check her blood pressure once daily with a home cuff.  She will follow-up with her primary care provider within the next week and otherwise return to the ED with any new or worsening symptoms including headache, vision changes, dizziness, chest pain, or any other new concerns.  The patient was in agreement with this plan and all questions were answered.      Diagnosis:    ICD-10-CM    1. Lightheadedness  R42       2. Hypertension, unspecified type  I10              Vero Segura PA-C  6/16/2023

## 2023-06-17 NOTE — ED PROVIDER NOTES
ED ATTENDING PHYSICIAN NOTE:   I evaluated this patient in conjunction with Vero Segura PA-C  I have participated in the care of the patient and personally performed key elements of the history, exam, and medical decision making.      HPI:   Kristi Aguero is a 68 year old female presenting with hypertension and intermittent episodes of lightheadedness that are random and unprovoked. She states that she has had labs completed at [facility] with nothing abnormal noted by her doctor. Denies any syncopal episodes. No chest pain, chest pressure, fevers, cold or flu symptoms, leg swelling, or headache. She denies any changes to her medications. Patient has had a stroke in the past thought to be due to hypertension.     Independent Historian:   None - Patient Only    Review of External Notes: None      EXAM:   General:  Alert, interactive, well appearing.  Cardiovascular:  Well perfused  Lungs:  No respiratory distress, no accessory muscle use  Neuro:  Moving all 4 extremities. Gait normal.  Skin:  Warm, dry  Psych:  Normal affect      ECG  ECG taken at 1836, ECG read at 1837  Normal sinus rhythm  Normal ECG   Rate 65 bpm. NY interval 146 ms. QRS duration 78 ms. QT/QTc 386/401 ms. P-R-T axes 79 58 67.     Labs  Labs Ordered and Resulted from Time of ED Arrival to Time of ED Departure   COMPREHENSIVE METABOLIC PANEL - Normal       Result Value    Sodium 136      Potassium 3.9      Chloride 98      Carbon Dioxide (CO2) 25      Anion Gap 13      Urea Nitrogen 16.2      Creatinine 0.74      Calcium 9.6      Glucose 85      Alkaline Phosphatase 62      AST 21      ALT 14      Protein Total 6.9      Albumin 4.5      Bilirubin Total 0.4      GFR Estimate 89     TSH WITH FREE T4 REFLEX - Normal    TSH 3.04     MAGNESIUM - Normal    Magnesium 2.3     CBC WITH PLATELETS AND DIFFERENTIAL    WBC Count 4.4      RBC Count 4.14      Hemoglobin 12.7      Hematocrit 39.1      MCV 94      MCH 30.7      MCHC 32.5      RDW 12.7       Platelet Count 246      % Neutrophils 54      % Lymphocytes 35      % Monocytes 8      % Eosinophils 2      % Basophils 1      % Immature Granulocytes 0      NRBCs per 100 WBC 0      Absolute Neutrophils 2.4      Absolute Lymphocytes 1.5      Absolute Monocytes 0.4      Absolute Eosinophils 0.1      Absolute Basophils 0.1      Absolute Immature Granulocytes 0.0      Absolute NRBCs 0.0       Independent Interpretation (X-rays, CTs, rhythm strip):  None    Consultations/Discussion of Management or Tests:  None     Social Determinants of Health affecting care:   None     MEDICAL DECISION MAKING/ASSESSMENT AND PLAN:   Patient presents with lightheadedness/near syncope in the setting of hypertension. On my evaluation the patient is well appearing, hemodynamically stable and afebrile. No concerning headache, neurologic symptoms. ED evaluation is reassuring.  Patient to increase her lisinopril and follow up closely with PCP.     DIAGNOSIS:     ICD-10-CM    1. Lightheadedness  R42       2. Hypertension, unspecified type  I10                DISPOSITION:   The patient was discharged to home.     Scribe Disclosure:  Kevin MORRIS Hired, am serving as a scribe at 10:50 PM on 6/16/2023 to document services personally performed by Alden Sahni MD based on my observations and the provider's statements to me.     6/16/2023  [facility] EMERGENCY DEPT     Alden Sahni MD  06/17/23 [id number]

## 2023-06-17 NOTE — DISCHARGE INSTRUCTIONS
- Increase lisinopril from 20 mg daily to 40 mg daily  -Continue metoprolol as directed  -Check blood pressure with cuff once daily, record findings  -Follow-up with your primary care provider within the next week for reevaluation and ongoing management of blood pressure  -Return to the emergency department for any new or worsening symptoms including headache, vision changes, dizziness, chest pain, or any other new concerns

## 2023-10-25 ENCOUNTER — TELEPHONE (OUTPATIENT)
Dept: OBGYN | Facility: CLINIC | Age: 68
End: 2023-10-25
Payer: COMMERCIAL

## 2023-10-25 NOTE — TELEPHONE ENCOUNTER
"Called pt regarding her request for an earlier apt w Dr Crabtree  Has annual scheduled 11/15  1-2 weeks ago started having mid-abdominal \"pains\", comes and goes but consistently, inconsistent.  \"Not pain but a discomfort.\"  Never has had to take OTC meds for relief.  Denies sx of constipation, UTI or vaginal infection  Can occur before or after eating.  No N/V.  Has an apt w her PCP for this.  She was worried it has something to do w her hx of HPV - reassured pt most likely not related.  Encouraged to keep apts; nothing earlier but when to seek ER care discussed.  She is not concerned that this is urgent as well.    Pt verbalized understanding, in agreement with plan, and voiced no further questions.    Regla Wooten RN on 10/25/2023 at 3:24 PM    "

## 2023-11-15 ENCOUNTER — ANCILLARY PROCEDURE (OUTPATIENT)
Dept: MAMMOGRAPHY | Facility: CLINIC | Age: 68
End: 2023-11-15
Payer: COMMERCIAL

## 2023-11-15 ENCOUNTER — OFFICE VISIT (OUTPATIENT)
Dept: OBGYN | Facility: CLINIC | Age: 68
End: 2023-11-15
Payer: COMMERCIAL

## 2023-11-15 VITALS
WEIGHT: 129 LBS | BODY MASS INDEX: 22.86 KG/M2 | HEIGHT: 63 IN | DIASTOLIC BLOOD PRESSURE: 78 MMHG | SYSTOLIC BLOOD PRESSURE: 118 MMHG | HEART RATE: 74 BPM

## 2023-11-15 DIAGNOSIS — Z12.31 VISIT FOR SCREENING MAMMOGRAM: ICD-10-CM

## 2023-11-15 DIAGNOSIS — Z01.419 ENCOUNTER FOR GYNECOLOGICAL EXAMINATION WITHOUT ABNORMAL FINDING: Primary | ICD-10-CM

## 2023-11-15 DIAGNOSIS — Z11.51 SCREENING FOR HUMAN PAPILLOMAVIRUS (HPV): ICD-10-CM

## 2023-11-15 PROCEDURE — 99212 OFFICE O/P EST SF 10 MIN: CPT | Performed by: OBSTETRICS & GYNECOLOGY

## 2023-11-15 PROCEDURE — G0145 SCR C/V CYTO,THINLAYER,RESCR: HCPCS | Performed by: OBSTETRICS & GYNECOLOGY

## 2023-11-15 PROCEDURE — 87624 HPV HI-RISK TYP POOLED RSLT: CPT | Performed by: OBSTETRICS & GYNECOLOGY

## 2023-11-15 PROCEDURE — 77063 BREAST TOMOSYNTHESIS BI: CPT | Mod: TC | Performed by: RADIOLOGY

## 2023-11-15 PROCEDURE — 77067 SCR MAMMO BI INCL CAD: CPT | Mod: TC | Performed by: RADIOLOGY

## 2023-11-15 RX ORDER — LISINOPRIL 40 MG/1
TABLET ORAL
COMMUNITY
Start: 2023-09-08

## 2023-11-15 ASSESSMENT — ANXIETY QUESTIONNAIRES
2. NOT BEING ABLE TO STOP OR CONTROL WORRYING: NOT AT ALL
1. FEELING NERVOUS, ANXIOUS, OR ON EDGE: NOT AT ALL
GAD7 TOTAL SCORE: 0
GAD7 TOTAL SCORE: 0
7. FEELING AFRAID AS IF SOMETHING AWFUL MIGHT HAPPEN: NOT AT ALL
6. BECOMING EASILY ANNOYED OR IRRITABLE: NOT AT ALL
5. BEING SO RESTLESS THAT IT IS HARD TO SIT STILL: NOT AT ALL
IF YOU CHECKED OFF ANY PROBLEMS ON THIS QUESTIONNAIRE, HOW DIFFICULT HAVE THESE PROBLEMS MADE IT FOR YOU TO DO YOUR WORK, TAKE CARE OF THINGS AT HOME, OR GET ALONG WITH OTHER PEOPLE: NOT DIFFICULT AT ALL
3. WORRYING TOO MUCH ABOUT DIFFERENT THINGS: NOT AT ALL

## 2023-11-15 ASSESSMENT — PATIENT HEALTH QUESTIONNAIRE - PHQ9
SUM OF ALL RESPONSES TO PHQ QUESTIONS 1-9: 0
5. POOR APPETITE OR OVEREATING: NOT AT ALL

## 2023-11-15 NOTE — PROGRESS NOTES
SUBJECTIVE:                                                   Kristi Aguero is a 68 year old female who presents to clinic today for the following health issue(s):  Patient presents with:  Physical      Additional information: Pelvic & Breast Exam     HPI:  Follows with PCp regularly.    No vb/discharge.    No LMP recorded. Patient is postmenopausal..     Patient is not sexually active, .  Using menopause for contraception.    reports that she has quit smoking. She has never used smokeless tobacco.    STD testing offered?  Declined  Care Gaps  Close care gaps     Overdue          Never done HEPATITIS C SCREENING (Once)       Never done LUNG CANCER SCREENING (Yearly)       OCT 26   2016 LIPID (Every 5 Years)  Last completed: Oct 26, 2011     Never done RSV VACCINE (Pregnancy & 60+) (1 - 1-dose 60+ series)       SEP 1   2023 INFLUENZA VACCINE (1)  Last completed: Sep 28, 2020     SEP 1   2023 COVID-19 Vaccine ( - - season)  Last completed: May 23, 2022           OCT 10   2023 MAMMO SCREENING (Yearly)   Scheduled for: Nov 15, 2023     OCT 10   2023 PAP FOLLOW-UP (Once)   Order placed this encounter     OCT 10   2023 HPV FOLLOW-UP (Once)   Order placed this encounter        Upcoming          2024 MEDICARE ANNUAL WELLNESS VISIT (Yearly)   Last completed: 2023     MAY 16   2027 COLORECTAL CANCER SCREENING (COLONOSCOPY - Preferred) (Every 10 Years)  Last completed: May 16, 2017     OCT 10   2027 ADVANCE CARE PLANNING (Every 5 Years)  Last completed: Oct 10, 2022     DEC 7   2028 DTAP/TDAP/TD IMMUNIZATION (4 - Td or Tdap)  Last completed: Dec 7, 2018     OCT 13   2036 DEXA (Every 15 Years)  Last completed: Oct 13, 2021     Health maintenance updated:  yes    Today's PHQ-2 Score:       11/15/2023     9:32 AM   PHQ-2 (  [employer])   Q1: Little interest or pleasure in doing things 0   Q2: Feeling down, depressed or hopeless 0   PHQ-2 Score 0     Today's PHQ-9 Score:       11/15/2023     9:32 AM  "  PHQ-9 SCORE   PHQ-9 Total Score 0     Today's ALEC-7 Score:       11/15/2023     9:32 AM   ALEC-7 SCORE   Total Score 0       Problem list and histories reviewed & adjusted, as indicated.  Additional history: as documented.    Patient Active Problem List   Diagnosis    Essential hypertension    Cerebral infarction (H)    Hypotension, postural    Cervical high risk HPV (human papillomavirus) test positive     Past Surgical History:   Procedure Laterality Date    NO HISTORY OF SURGERY        Social History     Tobacco Use    Smoking status: Former    Smokeless tobacco: Never    Tobacco comments:     Quit 40+ years ago   Substance Use Topics    Alcohol use: Yes     Alcohol/week: 4.0 standard drinks of alcohol     Types: 4 Standard drinks or equivalent per week      Problem (# of Occurrences) Relation (Name,Age of Onset)    Anxiety Disorder (1) Daughter    Diabetes (1) Mother    Hypertension (1) Mother    Osteoporosis (1) Father    Ovarian Cancer (1) Sister: Peritoneal    Endometrial Cancer (1) Sister (50)    Colon Cancer (1) Paternal Grandfather (60)    Esophageal Cancer (1) Mother              Current Outpatient Medications   Medication Sig    ASPIRIN PO Take  by mouth.      Cyanocobalamin (VITAMIN B-12 PO)     lisinopril (ZESTRIL) 40 MG tablet TAKE 1 TABLET(40 MG) BY MOUTH EVERY DAY    METOPROLOL SUCCINATE PO     VITAMIN D, CHOLECALCIFEROL, PO Take by mouth daily    atenolol (TENORMIN) 50 MG tablet Take 0.5 tablets by mouth daily.     No current facility-administered medications for this visit.     No Known Allergies    ROS:  12 point review of systems negative other than symptoms noted below or in the HPI.  No urinary frequency or dysuria, bladder or kidney problems      OBJECTIVE:     /78   Pulse 74   Ht 1.6 m (5' 3\")   Wt 58.5 kg (129 lb)   BMI 22.85 kg/m    Body mass index is 22.85 kg/m .    Exam:  Constitutional:  Appearance: Well nourished, well developed alert, in no acute distress  Breasts:  " Inspection of Breasts:  Symmetric bilaterally.  No puckering.  No skin changes.  Palpation of Breasts and Axillae:  No masses present on palpation, no breast tenderness Axillary Lymph Nodes:  No lymphadenopathy present  Psychiatric:  Mentation appears normal and affect normal/bright.  Pelvic Exam:  External Genitalia:     Normal appearance for age, no discharge present, no tenderness present, no inflammatory lesions present, color normal  Vagina:     Normal vaginal vault without central or paravaginal defects, no discharge present, no inflammatory lesions present, no masses present  Bladder:     Nontender to palpation  Urethra:   Urethral Body:  Urethra palpation normal, urethra structural support normal   Urethral Meatus:  No erythema or lesions present  Cervix:     Appearance healthy, no lesions present, nontender to palpation, no bleeding present  Uterus:     Uterus: firm, normal sized and nontender, midplane in position.   Adnexa:     No adnexal tenderness present, no adnexal masses present  Perineum:     Perineum within normal limits, no evidence of trauma, no rashes or skin lesions present  Anus:     Anus within normal limits, no hemorrhoids present  Inguinal Lymph Nodes:     No lymphadenopathy present  Pubic Hair:     Normal pubic hair distribution for age  Genitalia and Groin:     No rashes present, no lesions present, no areas of discoloration, no masses present       ASSESSMENT/PLAN:                                                        ICD-10-CM    1. Encounter for gynecological examination without abnormal finding  Z01.419 Pap thin layer screen with HPV - recommended age 30 - 65 years     HPV Hold (Lab Only)      2. Screening for human papillomavirus (HPV)  Z11.51 HPV High Risk Types DNA Cervical          Patient Instructions   -Daily total calcium intake (between food/supplements) should be 1200mg which equates to 5 servings calcium containing food per day; VItamin D 2000IU.   Foods rich in calcium  are: milk, cheese, yogurt, seafood, sardines and canned salmon, leafy green vegetables such as mark greens, spinach and kale, beans and lentils, almonds, seeds (poppy, sesame, celery, ranjana), rhubarb, dried fruit such as figs, whey protein, tofu and edamame, amaranth, other foods with added calcium such as orange juice and some cereals.   If adequate amount not taken in diet, then a supplement may be needed.     -I also recommend increasing your dietary fiber by starting Metamucil (powder mixed in glass of water) once to twice daily      -Pap/hpv obtained for cervical cancer screening. Manage per guidelines, including q 3yr pap testing for those <30 and q 5yr pap/hpv for those >30 when appropriate.   -Breast self awareness discussed. UTD for mammogram.  -Osteoporosis prevention discussed. Dexa UTD  -PCP manages labs  -PMB precautions  -Return one year for next annual exam        Morelia Teran Masters, DO  [facility]

## 2023-11-15 NOTE — PATIENT INSTRUCTIONS
-Daily total calcium intake (between food/supplements) should be 1200mg which equates to 5 servings calcium containing food per day; VItamin D 2000IU.   Foods rich in calcium are: milk, cheese, yogurt, seafood, sardines and canned salmon, leafy green vegetables such as mark greens, spinach and kale, beans and lentils, almonds, seeds (poppy, sesame, celery, ranjana), rhubarb, dried fruit such as figs, whey protein, tofu and edamame, amaranth, other foods with added calcium such as orange juice and some cereals.   If adequate amount not taken in diet, then a supplement may be needed.     -I also recommend increasing your dietary fiber by starting Metamucil (powder mixed in glass of water) once to twice daily

## 2023-11-21 LAB
HUMAN PAPILLOMA VIRUS 16 DNA: NEGATIVE
HUMAN PAPILLOMA VIRUS 18 DNA: NEGATIVE
HUMAN PAPILLOMA VIRUS FINAL DIAGNOSIS: NORMAL
HUMAN PAPILLOMA VIRUS OTHER HR: NEGATIVE

## 2024-08-11 ENCOUNTER — HEALTH MAINTENANCE LETTER (OUTPATIENT)
Age: 69
End: 2024-08-11

## 2024-11-18 ENCOUNTER — OFFICE VISIT (OUTPATIENT)
Dept: OBGYN | Facility: CLINIC | Age: 69
End: 2024-11-18
Payer: COMMERCIAL

## 2024-11-18 ENCOUNTER — ANCILLARY PROCEDURE (OUTPATIENT)
Dept: MAMMOGRAPHY | Facility: CLINIC | Age: 69
End: 2024-11-18
Payer: COMMERCIAL

## 2024-11-18 VITALS
SYSTOLIC BLOOD PRESSURE: 124 MMHG | HEIGHT: 63 IN | DIASTOLIC BLOOD PRESSURE: 78 MMHG | BODY MASS INDEX: 21.79 KG/M2 | WEIGHT: 123 LBS

## 2024-11-18 DIAGNOSIS — Z78.0 ASYMPTOMATIC MENOPAUSAL STATE: Primary | ICD-10-CM

## 2024-11-18 DIAGNOSIS — Z12.31 VISIT FOR SCREENING MAMMOGRAM: ICD-10-CM

## 2024-11-18 DIAGNOSIS — Z23 NEED FOR PROPHYLACTIC VACCINATION AND INOCULATION AGAINST INFLUENZA: ICD-10-CM

## 2024-11-18 DIAGNOSIS — Z01.419 ENCOUNTER FOR BREAST AND PELVIC EXAMINATION: ICD-10-CM

## 2024-11-18 PROCEDURE — G0101 CA SCREEN;PELVIC/BREAST EXAM: HCPCS | Performed by: OBSTETRICS & GYNECOLOGY

## 2024-11-18 PROCEDURE — 77063 BREAST TOMOSYNTHESIS BI: CPT | Mod: TC | Performed by: RADIOLOGY

## 2024-11-18 PROCEDURE — 77067 SCR MAMMO BI INCL CAD: CPT | Mod: TC | Performed by: RADIOLOGY

## 2024-11-18 PROCEDURE — G0008 ADMIN INFLUENZA VIRUS VAC: HCPCS | Performed by: OBSTETRICS & GYNECOLOGY

## 2024-11-18 PROCEDURE — 90662 IIV NO PRSV INCREASED AG IM: CPT | Performed by: OBSTETRICS & GYNECOLOGY

## 2024-11-18 ASSESSMENT — ANXIETY QUESTIONNAIRES
6. BECOMING EASILY ANNOYED OR IRRITABLE: NOT AT ALL
IF YOU CHECKED OFF ANY PROBLEMS ON THIS QUESTIONNAIRE, HOW DIFFICULT HAVE THESE PROBLEMS MADE IT FOR YOU TO DO YOUR WORK, TAKE CARE OF THINGS AT HOME, OR GET ALONG WITH OTHER PEOPLE: NOT DIFFICULT AT ALL
3. WORRYING TOO MUCH ABOUT DIFFERENT THINGS: NOT AT ALL
1. FEELING NERVOUS, ANXIOUS, OR ON EDGE: NOT AT ALL
5. BEING SO RESTLESS THAT IT IS HARD TO SIT STILL: NOT AT ALL
GAD7 TOTAL SCORE: 0
2. NOT BEING ABLE TO STOP OR CONTROL WORRYING: NOT AT ALL
GAD7 TOTAL SCORE: 0
7. FEELING AFRAID AS IF SOMETHING AWFUL MIGHT HAPPEN: NOT AT ALL

## 2024-11-18 ASSESSMENT — PATIENT HEALTH QUESTIONNAIRE - PHQ9
SUM OF ALL RESPONSES TO PHQ QUESTIONS 1-9: 0
5. POOR APPETITE OR OVEREATING: NOT AT ALL

## 2024-11-18 NOTE — PROGRESS NOTES
SUBJECTIVE:                                                   Kristi Aguero is a 69 year old female who presents to clinic today for the following health issue(s):  breast and pelvic exam     HPI:  Follows with PCP regularly.    Desires Influenza vaccine.     No pelvic pain.  No concerns.  No vb/discharge.     Daughter is getting  in Sept next year. Have a lot of the planning set. Have a  which helps. Found THE dress too    No LMP recorded. Patient is postmenopausal..     Patient is not sexually active, .  Using menopause for contraception.    reports that she has quit smoking. She has never used smokeless tobacco.    STD testing offered?  Declined    Health maintenance updated:  yes  Care Gaps  Close care gaps  Overdue          Never done HEPATITIS C SCREENING (Once)     Never done LUNG CANCER SCREENING (Yearly)     OCT 26  2016 LIPID (Every 5 Years)  Last completed: Oct 26, 2011     PILAR 1  2024 MEDICARE ANNUAL WELLNESS VISIT (Yearly)   Last completed:  BMP (Yearly)  Last completed: 2023     SEP 1  2024 INFLUENZA VACCINE (1)  Last completed: Sep 28, 2020     SEP 1  2024 COVID-19 Vaccine ( season)  Last completed: May 23, 2022          NOV 15  2024 MAMMO SCREENING (Yearly)   Scheduled for: 2024         Upcoming          2026 GLUCOSE (Every 3 Years)  Last completed: 2023     NOV 15  2026 PAP FOLLOW-UP (Once)  Last completed: Nov 15, 2023     NOV 15  2026 HPV FOLLOW-UP (Once)  Last completed: Nov 15, 2023     MAY 16  2027 COLORECTAL CANCER SCREENING (COLONOSCOPY) (Every 10 Years)  Last completed: May 16, 2017     OCT 10  2027 ADVANCE CARE PLANNING (Every 5 Years)  Last completed: Oct 10, 2022     DEC 7  2028 DTAP/TDAP/TD IMMUNIZATION (4 - Td or Tdap)  Last completed: Dec 7, 2018     FEB 1   RSV VACCINE (1 - 1-dose 75+ series)       OCT 13  2036 DEXA (Every 15 Years)  Last completed: Oct 13, 2021     Today's PHQ-2  Score:       11/18/2024     9:33 AM   PHQ-2 ( 1999 Pf[employer])   Q1: Little interest or pleasure in doing things 0   Q2: Feeling down, depressed or hopeless 0   PHQ-2 Score 0     Today's PHQ-9 Score:       11/18/2024     9:33 AM   PHQ-9 SCORE   PHQ-9 Total Score 0     Today's ALEC-7 Score:       11/18/2024     9:33 AM   ALEC-7 SCORE   Total Score 0       Problem list and histories reviewed & adjusted, as indicated.  Additional history: as documented.    Patient Active Problem List   Diagnosis    Essential hypertension    Cerebral infarction (H)    Hypotension, postural    Cervical high risk HPV (human papillomavirus) test positive     Past Surgical History:   Procedure Laterality Date    NO HISTORY OF SURGERY        Social History     Tobacco Use    Smoking status: Former    Smokeless tobacco: Never    Tobacco comments:     Quit 40+ years ago   Substance Use Topics    Alcohol use: Yes     Alcohol/week: 4.0 standard drinks of alcohol     Types: 4 Standard drinks or equivalent per week      Problem (# of Occurrences) Relation (Name,Age of Onset)    Anxiety Disorder (1) Daughter    Diabetes (1) Mother    Hypertension (1) Mother    Osteoporosis (1) Father    Ovarian Cancer (1) Sister: Peritoneal    Endometrial Cancer (1) Sister (50)    Colon Cancer (1) Paternal Grandfather (60)    Esophageal Cancer (1) Mother              Current Outpatient Medications   Medication Sig Dispense Refill    ASPIRIN PO Take  by mouth.        atenolol (TENORMIN) 50 MG tablet Take 0.5 tablets by mouth daily. 90 tablet 0    Cyanocobalamin (VITAMIN B-12 PO)       lisinopril (ZESTRIL) 40 MG tablet TAKE 1 TABLET(40 MG) BY MOUTH EVERY DAY      METOPROLOL SUCCINATE PO       VITAMIN D, CHOLECALCIFEROL, PO Take by mouth daily       No current facility-administered medications for this visit.     No Known Allergies    ROS:  12 point review of systems negative other than symptoms noted below or in the HPI.  No urinary frequency or dysuria, bladder or kidney  "problems      OBJECTIVE:     /78   Ht 1.6 m (5' 3\")   Wt 55.8 kg (123 lb)   BMI 21.79 kg/m    Body mass index is 21.79 kg/m .    Exam:  Constitutional:  Appearance: Well nourished, well developed alert, in no acute distress  Breasts:  Inspection of Breasts:  Symmetric bilaterally.  No puckering.  No skin changes.  Palpation of Breasts and Axillae:  No masses present on palpation, no breast tenderness Axillary Lymph Nodes:  No lymphadenopathy present  Gastrointestinal:  Abdominal Examination:  Abdomen nontender to palpation, tone normal without rigidity or guarding, no masses present, umbilicus without lesions; Liver/Spleen:  No hepatomegaly present, liver nontender to palpation; Hernias:  No hernias present  Psychiatric:  Mentation appears normal and affect normal/bright.  Pelvic Exam:  External Genitalia:     Normal appearance for age, no discharge present, no tenderness present, no inflammatory lesions present, color normal  Vagina:     Normal vaginal vault without central or paravaginal defects, no discharge present, no inflammatory lesions present, no masses present  Bladder:     Nontender to palpation  Urethra:   Urethral Body:  Urethra palpation normal, urethra structural support normal   Urethral Meatus:  No erythema or lesions present  Cervix:     Appearance healthy, no lesions present, nontender to palpation, no bleeding present  Uterus:     Uterus: firm, normal sized and nontender, midplane in position.   Adnexa:     No adnexal tenderness present, no adnexal masses present  Perineum:     Perineum within normal limits, no evidence of trauma, no rashes or skin lesions present  Anus:     Anus within normal limits, no hemorrhoids present  Inguinal Lymph Nodes:     No lymphadenopathy present  Pubic Hair:     Normal pubic hair distribution for age  Genitalia and Groin:     No rashes present, no lesions present, no areas of discoloration, no masses present     In-Clinic Test Results:  No results found " for this or any previous visit (from the past 24 hours).    ASSESSMENT/PLAN:                                                        ICD-10-CM    1. Asymptomatic menopausal state  Z78.0 DX Bone Density      2. Need for prophylactic vaccination and inoculation against influenza  Z23       3. Encounter for breast and pelvic examination  Z01.419           Patient Instructions   -Daily total calcium intake (between food/supplements) should be 1200mg which equates to 5 servings calcium containing food per day; VItamin D 1000IU.   Foods rich in calcium are: milk, cheese, yogurt, seafood, sardines and canned salmon, leafy green vegetables such as mark greens, spinach and kale, beans and lentils, almonds, seeds (poppy, sesame, celery, ranjana), rhubarb, dried fruit such as figs, whey protein, tofu and edamame, amaranth, other foods with added calcium such as orange juice and some cereals.   If adequate amount not taken in diet, then a supplement may be needed.     -I also recommend increasing your dietary fiber by starting Metamucil (powder mixed in glass of water) once to twice daily      -UTD for cervical cancer screening.  Due next year for pap/hpv  -Breast self awareness discussed. Due for mammogram.  -Osteoporosis prevention discussed.  Due for dexa  -Follows with PCP for preventative care  -Given Influenza vaccine.  -PMB precautions  -Return one year for next women's health exam        Morelia Teran Masters, DO  [facility] [place]

## 2024-11-19 ENCOUNTER — PATIENT OUTREACH (OUTPATIENT)
Dept: CARE COORDINATION | Facility: CLINIC | Age: 69
End: 2024-11-19
Payer: COMMERCIAL

## 2024-11-21 ENCOUNTER — PATIENT OUTREACH (OUTPATIENT)
Dept: CARE COORDINATION | Facility: CLINIC | Age: 69
End: 2024-11-21
Payer: COMMERCIAL

## 2025-01-22 ENCOUNTER — ANCILLARY PROCEDURE (OUTPATIENT)
Dept: BONE DENSITY | Facility: CLINIC | Age: 70
End: 2025-01-22
Attending: OBSTETRICS & GYNECOLOGY
Payer: COMMERCIAL

## 2025-01-22 PROCEDURE — 77080 DXA BONE DENSITY AXIAL: CPT | Mod: TC
